# Patient Record
Sex: MALE | Race: WHITE | NOT HISPANIC OR LATINO | Employment: FULL TIME | URBAN - METROPOLITAN AREA
[De-identification: names, ages, dates, MRNs, and addresses within clinical notes are randomized per-mention and may not be internally consistent; named-entity substitution may affect disease eponyms.]

---

## 2017-01-01 ENCOUNTER — HOSPITAL ENCOUNTER (INPATIENT)
Facility: CLINIC | Age: 22
LOS: 1 days | Discharge: HOME | DRG: 885 | End: 2017-01-02
Attending: PSYCHIATRY & NEUROLOGY | Admitting: PSYCHIATRY & NEUROLOGY

## 2017-01-01 ENCOUNTER — HOSPITAL ENCOUNTER (EMERGENCY)
Facility: CLINIC | Age: 22
Discharge: ANOTHER HOSPITAL | End: 2017-01-01
Attending: EMERGENCY MEDICINE | Admitting: EMERGENCY MEDICINE

## 2017-01-01 VITALS
TEMPERATURE: 97.7 F | OXYGEN SATURATION: 98 % | SYSTOLIC BLOOD PRESSURE: 103 MMHG | DIASTOLIC BLOOD PRESSURE: 49 MMHG | RESPIRATION RATE: 18 BRPM

## 2017-01-01 DIAGNOSIS — F10.920 ALCOHOL INTOXICATION, UNCOMPLICATED (H): ICD-10-CM

## 2017-01-01 DIAGNOSIS — F33.2 MAJOR DEPRESSIVE DISORDER, RECURRENT SEVERE WITHOUT PSYCHOTIC FEATURES (H): Primary | ICD-10-CM

## 2017-01-01 DIAGNOSIS — R45.851 SUICIDAL IDEATION: ICD-10-CM

## 2017-01-01 PROBLEM — F32.A DEPRESSED: Status: ACTIVE | Noted: 2017-01-01

## 2017-01-01 PROBLEM — F10.10 ALCOHOL CONSUMPTION BINGE DRINKING: Status: ACTIVE | Noted: 2017-01-01

## 2017-01-01 PROBLEM — F17.223: Status: ACTIVE | Noted: 2017-01-01

## 2017-01-01 LAB
ALBUMIN SERPL-MCNC: 4.7 G/DL (ref 3.4–5)
ALBUMIN UR-MCNC: NEGATIVE MG/DL
ALP SERPL-CCNC: 52 U/L (ref 40–150)
ALT SERPL W P-5'-P-CCNC: 24 U/L (ref 0–70)
AMPHETAMINES UR QL SCN: NORMAL
ANION GAP SERPL CALCULATED.3IONS-SCNC: 8 MMOL/L (ref 3–14)
APAP SERPL-MCNC: NORMAL MG/L (ref 10–20)
APPEARANCE UR: CLEAR
AST SERPL W P-5'-P-CCNC: 26 U/L (ref 0–45)
BARBITURATES UR QL: NORMAL
BASOPHILS # BLD AUTO: 0 10E9/L (ref 0–0.2)
BASOPHILS NFR BLD AUTO: 0.5 %
BENZODIAZ UR QL: NORMAL
BILIRUB SERPL-MCNC: 0.9 MG/DL (ref 0.2–1.3)
BILIRUB UR QL STRIP: NEGATIVE
BUN SERPL-MCNC: 13 MG/DL (ref 7–30)
CALCIUM SERPL-MCNC: 8.7 MG/DL (ref 8.5–10.1)
CANNABINOIDS UR QL SCN: NORMAL
CHLORIDE SERPL-SCNC: 107 MMOL/L (ref 94–109)
CO2 SERPL-SCNC: 28 MMOL/L (ref 20–32)
COCAINE UR QL: NORMAL
COLOR UR AUTO: ABNORMAL
CREAT SERPL-MCNC: 0.98 MG/DL (ref 0.66–1.25)
DIFFERENTIAL METHOD BLD: NORMAL
EOSINOPHIL # BLD AUTO: 0.1 10E9/L (ref 0–0.7)
EOSINOPHIL NFR BLD AUTO: 1.6 %
ERYTHROCYTE [DISTWIDTH] IN BLOOD BY AUTOMATED COUNT: 11.6 % (ref 10–15)
ETHANOL SERPL-MCNC: 0.14 G/DL
GFR SERPL CREATININE-BSD FRML MDRD: NORMAL ML/MIN/1.7M2
GLUCOSE SERPL-MCNC: 82 MG/DL (ref 70–99)
GLUCOSE UR STRIP-MCNC: NEGATIVE MG/DL
HCT VFR BLD AUTO: 46.4 % (ref 40–53)
HGB BLD-MCNC: 16.9 G/DL (ref 13.3–17.7)
HGB UR QL STRIP: NEGATIVE
IMM GRANULOCYTES # BLD: 0 10E9/L (ref 0–0.4)
IMM GRANULOCYTES NFR BLD: 0.1 %
KETONES UR STRIP-MCNC: NEGATIVE MG/DL
LEUKOCYTE ESTERASE UR QL STRIP: NEGATIVE
LYMPHOCYTES # BLD AUTO: 2.1 10E9/L (ref 0.8–5.3)
LYMPHOCYTES NFR BLD AUTO: 26.1 %
MCH RBC QN AUTO: 31.5 PG (ref 26.5–33)
MCHC RBC AUTO-ENTMCNC: 36.4 G/DL (ref 31.5–36.5)
MCV RBC AUTO: 86 FL (ref 78–100)
MONOCYTES # BLD AUTO: 0.6 10E9/L (ref 0–1.3)
MONOCYTES NFR BLD AUTO: 7.9 %
MUCOUS THREADS #/AREA URNS LPF: PRESENT /LPF
NEUTROPHILS # BLD AUTO: 5.2 10E9/L (ref 1.6–8.3)
NEUTROPHILS NFR BLD AUTO: 63.8 %
NITRATE UR QL: NEGATIVE
NRBC # BLD AUTO: 0 10*3/UL
NRBC BLD AUTO-RTO: 0 /100
OPIATES UR QL SCN: NORMAL
PCP UR QL SCN: NORMAL
PH UR STRIP: 5 PH (ref 5–7)
PLATELET # BLD AUTO: 210 10E9/L (ref 150–450)
POTASSIUM SERPL-SCNC: 3.5 MMOL/L (ref 3.4–5.3)
PROT SERPL-MCNC: 8 G/DL (ref 6.8–8.8)
RBC # BLD AUTO: 5.37 10E12/L (ref 4.4–5.9)
RBC #/AREA URNS AUTO: 0 /HPF (ref 0–2)
SALICYLATES SERPL-MCNC: NORMAL MG/DL
SODIUM SERPL-SCNC: 143 MMOL/L (ref 133–144)
SP GR UR STRIP: 1 (ref 1–1.03)
URN SPEC COLLECT METH UR: ABNORMAL
UROBILINOGEN UR STRIP-MCNC: NORMAL MG/DL (ref 0–2)
WBC # BLD AUTO: 8.1 10E9/L (ref 4–11)
WBC #/AREA URNS AUTO: <1 /HPF (ref 0–2)

## 2017-01-01 PROCEDURE — 85025 COMPLETE CBC W/AUTO DIFF WBC: CPT | Performed by: EMERGENCY MEDICINE

## 2017-01-01 PROCEDURE — 99285 EMERGENCY DEPT VISIT HI MDM: CPT

## 2017-01-01 PROCEDURE — 12400001 ZZH R&B MH UMMC

## 2017-01-01 PROCEDURE — 80329 ANALGESICS NON-OPIOID 1 OR 2: CPT | Performed by: EMERGENCY MEDICINE

## 2017-01-01 PROCEDURE — 99222 1ST HOSP IP/OBS MODERATE 55: CPT | Mod: AI | Performed by: PSYCHIATRY & NEUROLOGY

## 2017-01-01 PROCEDURE — 25000132 ZZH RX MED GY IP 250 OP 250 PS 637: Performed by: PSYCHIATRY & NEUROLOGY

## 2017-01-01 PROCEDURE — 80053 COMPREHEN METABOLIC PANEL: CPT | Performed by: EMERGENCY MEDICINE

## 2017-01-01 PROCEDURE — 80307 DRUG TEST PRSMV CHEM ANLYZR: CPT | Performed by: EMERGENCY MEDICINE

## 2017-01-01 PROCEDURE — 93005 ELECTROCARDIOGRAM TRACING: CPT

## 2017-01-01 PROCEDURE — 81001 URINALYSIS AUTO W/SCOPE: CPT | Performed by: EMERGENCY MEDICINE

## 2017-01-01 PROCEDURE — 80320 DRUG SCREEN QUANTALCOHOLS: CPT | Performed by: EMERGENCY MEDICINE

## 2017-01-01 RX ORDER — BISACODYL 10 MG
10 SUPPOSITORY, RECTAL RECTAL DAILY PRN
Status: DISCONTINUED | OUTPATIENT
Start: 2017-01-01 | End: 2017-01-02 | Stop reason: HOSPADM

## 2017-01-01 RX ORDER — IBUPROFEN 200 MG
600 TABLET ORAL EVERY 6 HOURS PRN
Status: DISCONTINUED | OUTPATIENT
Start: 2017-01-01 | End: 2017-01-02 | Stop reason: HOSPADM

## 2017-01-01 RX ORDER — NICOTINE 21 MG/24HR
1 PATCH, TRANSDERMAL 24 HOURS TRANSDERMAL DAILY
Status: DISCONTINUED | OUTPATIENT
Start: 2017-01-01 | End: 2017-01-02 | Stop reason: HOSPADM

## 2017-01-01 RX ORDER — OLANZAPINE 10 MG/2ML
10 INJECTION, POWDER, FOR SOLUTION INTRAMUSCULAR
Status: DISCONTINUED | OUTPATIENT
Start: 2017-01-01 | End: 2017-01-02 | Stop reason: HOSPADM

## 2017-01-01 RX ORDER — OLANZAPINE 10 MG/1
10 TABLET ORAL
Status: DISCONTINUED | OUTPATIENT
Start: 2017-01-01 | End: 2017-01-02 | Stop reason: HOSPADM

## 2017-01-01 RX ORDER — TRAZODONE HYDROCHLORIDE 50 MG/1
50 TABLET, FILM COATED ORAL
Status: DISCONTINUED | OUTPATIENT
Start: 2017-01-01 | End: 2017-01-02 | Stop reason: HOSPADM

## 2017-01-01 RX ORDER — ACETAMINOPHEN 325 MG/1
650 TABLET ORAL EVERY 4 HOURS PRN
Status: DISCONTINUED | OUTPATIENT
Start: 2017-01-01 | End: 2017-01-02 | Stop reason: HOSPADM

## 2017-01-01 RX ORDER — SERTRALINE HYDROCHLORIDE 25 MG/1
25 TABLET, FILM COATED ORAL DAILY
Status: DISCONTINUED | OUTPATIENT
Start: 2017-01-01 | End: 2017-01-02 | Stop reason: HOSPADM

## 2017-01-01 RX ORDER — HYDROXYZINE HYDROCHLORIDE 25 MG/1
25-50 TABLET, FILM COATED ORAL EVERY 4 HOURS PRN
Status: DISCONTINUED | OUTPATIENT
Start: 2017-01-01 | End: 2017-01-02 | Stop reason: HOSPADM

## 2017-01-01 RX ORDER — ALUMINA, MAGNESIA, AND SIMETHICONE 2400; 2400; 240 MG/30ML; MG/30ML; MG/30ML
30 SUSPENSION ORAL EVERY 4 HOURS PRN
Status: DISCONTINUED | OUTPATIENT
Start: 2017-01-01 | End: 2017-01-02 | Stop reason: HOSPADM

## 2017-01-01 RX ADMIN — SERTRALINE HYDROCHLORIDE 25 MG: 25 TABLET ORAL at 13:04

## 2017-01-01 RX ADMIN — NICOTINE POLACRILEX 8 MG: 4 GUM, CHEWING ORAL at 13:02

## 2017-01-01 RX ADMIN — NICOTINE POLACRILEX 8 MG: 4 GUM, CHEWING ORAL at 18:34

## 2017-01-01 RX ADMIN — TRAZODONE HYDROCHLORIDE 50 MG: 50 TABLET ORAL at 20:46

## 2017-01-01 RX ADMIN — NICOTINE 1 PATCH: 21 PATCH, EXTENDED RELEASE TRANSDERMAL at 09:23

## 2017-01-01 RX ADMIN — IBUPROFEN 600 MG: 200 TABLET, FILM COATED ORAL at 11:03

## 2017-01-01 ASSESSMENT — ACTIVITIES OF DAILY LIVING (ADL)
BATHING: 0-->INDEPENDENT
GROOMING: INDEPENDENT
TRANSFERRING: 0-->INDEPENDENT
RETIRED_EATING: 0-->INDEPENDENT
RETIRED_COMMUNICATION: 0-->UNDERSTANDS/COMMUNICATES WITHOUT DIFFICULTY
DRESS: 0-->INDEPENDENT
TOILETING: 0-->INDEPENDENT
PRIOR_FUNCTIONAL_LEVEL_COMMENT: OK
FALL_HISTORY_WITHIN_LAST_SIX_MONTHS: NO
LAUNDRY: UNABLE TO COMPLETE
DRESS: SCRUBS (BEHAVIORAL HEALTH);INDEPENDENT
SWALLOWING: 0-->SWALLOWS FOODS/LIQUIDS WITHOUT DIFFICULTY
ORAL_HYGIENE: INDEPENDENT
AMBULATION: 0-->INDEPENDENT
COGNITION: 0 - NO COGNITION ISSUES REPORTED

## 2017-01-01 NOTE — ED PROVIDER NOTES
"  History   Chief Complaint:  Suicidal and alcohol intoxication      HPI   Brady Sullivan is a 21 year old male who presents to the ED for evaluation of suicide attempt and alcohol intoxication. The patient indicates taht he started drinking this afternoon and tonight he was having flash backs and he started cutting his thighs. He contacted his friend and told him that \"I will blow my head off\". Patient had guns in the house and his friend took the guns away and called 911. Patient reports that he has a history of depression but does not take any medications currently.   Patient admits to depression and is not currently undergoing treatment.  Patient was intragraming with his friends who called 911.  Denies drugs abut admits to drinking today- last drink about 2 hours ago.  Patient states he has a long history of depression but is currently untreated.    Allergies:  NKDA     Medications:    The patient is currently on no regular medications.     Past Medical History:    Boxer's fracture    Past Surgical History:    The patient does not have any pertinent past surgical history.    Family History:    No past pertinent family history.    Social History:  Light tobacco smoker   Alcohol use-weekly   Marital Status:  Single     Review of Systems   Psychiatric/Behavioral: Positive for suicidal ideas and self-injury.   All other systems reviewed and are negative.  Patient with h/o suicidal ideations. H/o cutting and depression. Per EMS, patient was out with friends drinking tonight and got into a fight with his friend, began talking about suicide. Patients friend took any weapons that he could harm himself with and called 911 for patient to be evaluated. Patient states that he had a plan which involved cutting himself until he bled out.     Physical Exam   First Vitals:  BP: 139/89 mmHg  Heart Rate: 98  Temp: 97.7  F (36.5  C)  Resp: 18  SpO2: 99 %  (normal)    Physical Exam   Musculoskeletal:        Legs:    GEN: patient " tearful  HEAD: atraumatic, normocephalic  EYES: pupils reactive, conjunctivae normal  ENT: TMs flat and white bilaterally, oropharynx normal with no erythema or exudate, mucus membranes dry  NECK: no cervical LAD  RESPIRATORY: no tachypnea, breath sounds clear to auscultation (no rales, wheezes, rhonchi)  CVS: normal S1/S2, no murmurs/rubs/gallops  ABDOMEN: soft, nontender, no masses or organomegaly, no rebound, positive bowel sounds  BACK: no costovertebral angle tenderness, no spinal tenderness  EXTREMITIES: intact pulses x  2 (radial pulses intact) no edema  SKIN: warm and dry, abrasions (superficial) noted on bilateral anterior thighs.  None need stitches.  No bleeding.  NEURO: GCS 15, cranial nerves intact.  Motor- moves all 4 extremities.  Sensation- intact.  Coordination- ambulatory.  Overall symmetrical exam  HEME: no bruising or petechiae/contusions  LYMPH: no lymphadenopathy  PSYCH: Patient tearful, upset.  Normal mental status.    Emergency Department Course   ECG:  Indication: Alcohol intoxication   Time: 0402  Vent. Rate 91 bpm. ND interval 214. QRS duration 92. QT/QTc 338/415. P-R-T axis 73 48 55. Sinus rhythm with 1st degree AV block, otherwise normal ECG Read time: 0402  No acute ST or T wave changes.    Laboratory:  UA with micro: Mucous present o/w negative  CBC: WBC: 8.1, HGB: 16.9, PLT: 210  CMP: All WNL (Creatinine: 0.98)    Acetaminophen level <2  Salicylate level <2  Alcohol ethyl: 0.14(H)  Drug screen: All negative     ED Interventions:  [place]  Cardiac/Sp02 monitoring    Emergency Department Course:  Nursing notes and vitals reviewed. I performed an exam of the patient as documented above.     Blood drawn. This was sent to the lab for further testing, results above.    The patient provided a urine sample here in the emergency department. This was sent for laboratory testing, findings above.     4:50 AM Called intake, on hold    Patient voluntary    06:00 I signed off the patient to my  "collegue Dr. Kelley, awaiting DEC evaluation and intake.     /49 mmHg  Temp(Src) 97.7  F (36.5  C) (Temporal)  Resp 18  SpO2 98%      Impression & Plan    Medical Decision Making:  Brady Sullivan is a 21 year old male who felt suicidal and was snap chatting with his friends and admitted to them that he was suicidal and would like inpatient treatment. He made a suicide attempt tonight by cutting his thighs.   Patient's friends called 911 and the patient is brought to the ED for evaluation.  On examination, he has multiple superficial cuts/abrasion on his thighs but nothing that needs stitches. He admits to drinking alcohol. His alcohol level 0.12 (ie he is acutely intoxicated). IV was placed and labs collected.  Salicylate and Tylenol are negative. He has plans to \"blow his head off.\" Guns are apparently in his house. His urinalysis does not show any evidence of infection. His drug abuse screen is negative. CMP was normal including his CBC.     He has been placed on a hold and are attempting to contact intake so the patient would be taken to inpatient psychiatry.   Patient is placed on an [facility] hold.    Diagnosis:  Alcohol intoxication  Suicidal ideation    ICD-10-CM                                                Lizette Said  1/1/2017   [facility] EMERGENCY DEPARTMENT    Lizette CALLAHAN Said, am serving as a scribe on 1/1/2017 at 3:38 AM to personally document services performed by Dania Samaniego MD based on my observations and the provider's statements to me.       Dania Samaniego MD  01/01/17 6650  "

## 2017-01-01 NOTE — PROGRESS NOTES
"Initial Psychosocial Assessment    I have reviewed the chart, met with the patient, and developed Care Plan.  Information for assessment was obtained from:     Electronic Records and interview with patient    Presenting Problem:  Per patient: \"Flashbacks from all the times I cut before, last night something triggered it and I went back to my old ways.\"  He reports that he had been drinking.    Per ED note: Brady Sullivan is a 21 year old male who presents to the ED for evaluation of suicide attempt and alcohol intoxication. The patient indicates taht he started drinking this afternoon and tonight he was having flash backs and he started cutting his thighs. He contacted his friend and told him that \"I will blow my head off\". Patient had guns in the house and his friend took the guns away and called 911. Patient reports that he has a history of depression but does not take any medications currently.   Patient admits to depression and is not currently undergoing treatment.  Patient was intragraming with his friends who called 911.  Denies drugs abut admits to drinking today- last drink about 2 hours ago.    History of Mental Health and Chemical Dependency:  This is the patient's first hospitalization for mental health.  He reports that last night was the first time he had cut in three years.  He reports that three years ago he cut every other day for a week or so.  Denies any issues of chemical use.    Family Description (Constellation, Family Psychiatric History):  Patient grew up in [place], was raised by his parents. He has two younger sisters. Denies any MH or CD issues within his family.    Significant Life Events (Illness, Abuse, Trauma, Death):  Reports that he was engaged three years ago and found his finance cheated on him three times within a weekend, that was when he started cutting    Living Situation:  Lives with grandmother in [place]    Educational Background:  Graduated HS    Occupational " "History:  Full time  at [employer]    Financial Status:  Works    Legal Issues:  Denies    Ethnic/Cultural Issues:  Denies    Spiritual Orientation:  \"Nope\"     Service History:  \"I was planning on joining the [employer], did mini boot camp, and then no.\"    Social Functioning (organization, interests):  Patient enjoys hanging out with his girlfriend of three weeks, plays hockey, watch movies and relax    Current Treatment Providers are:  Therapist: None  Psychiatrist: None    Social Service Assessment/Plan:  Patient has been admitted for psychiatric stabilization due to increased depression with SI. Patient will have psychiatric assessment and medication management by the psychiatrist. Medications will be reviewed and adjusted per MD as indicated. The treatment team will continue to assess and stabilize the patient's mental health symptoms with the use of medications and therapeutic programming. Hospital staff will provide a safe environment and a therapeutic milieu. Staff will continue to assess patient as needed. Patient will participate in unit groups and activities. Patient will receive individual and group support on the unit.  CTC will do individual inpatient treatment planning and after care planning. CTC will discuss options for increasing community supports with the patient. CTC will coordinate with outpatient providers and will place referrals to ensure appropriate follow up care is in place.  "

## 2017-01-01 NOTE — PLAN OF CARE
Problem: General Plan of Care (Inpatient Behavioral)  Goal: Team Discussion  Team Plan:  BEHAVIORAL TEAM DISCUSSION    Continued Stay Criteria/Rationale: Increased depression with SI  Plan: Monitor, assess and stabilize  Participants: Nicholas Ramos RN; FLORINDA Woods  Summary/Recommendation: The plan is to assess and patient for mental health and medication needs.  The patient will be prescribed medications to treat the identified symptoms.  Upon discharge the patient will be referred to services as appropriate based on the assessment.  He would benefit from a mental health therapist.  Medical/Physical: Deferred to medicine  Progress: Initial

## 2017-01-01 NOTE — ED NOTES
Patient with h/o suicidal ideations. H/o cutting and depression. Per EMS, patient was out with friends drinking tonight and got into a fight with his friend, began talking about suicide. Patients friend took any weapons that he could harm himself with and called 911 for patient to be evaluated. Patient states that he had a plan which involved cutting himself until he bled out. Patient is willing to contact staff with any further thoughts.

## 2017-01-01 NOTE — PROGRESS NOTES
01/01/17 0836   Patient Belongings   Did you bring any home meds/supplements to the hospital?  No   Patient Belongings cell phone/electronics;clothing;shoes;money (see comment);wallet   Disposition of Belongings debit cards x5 & [place] to security, All other belongings to pt bin in locked utility   Belongings Search Yes   Clothing Search Yes   Second Staff Nicholas Ramos RN     Brown boots, cell phone, , wallet brown, t-shirt, black sweatshirt, sweat pants to pt bin    To Security: [place], Debit [phone]    ADMISSION:  I am responsible for any personal items that are not sent to the safe or pharmacy. [facility] is not responsible for loss, theft or damage of any property in my possession.    Patient Signature _____________________ Date/Time _____________________    Staff Signature _______________________ Date/Time _____________________    2nd Staff person, if patient is unable/unwilling to sign  ___________________________________ Date/Time _____________________    DISCHARGE:  My personal items have been returned to me.   Patient Signature _____________________ Date/Time _____________________

## 2017-01-01 NOTE — IP AVS SNAPSHOT
Young Adult [facility]    [facility] [address]    [address] [address]    Phone:  [phone]                                       After Visit Summary   1/1/2017    Brady Sullivan    MRN: [id number]           After Visit Summary Signature Page     I have received my discharge instructions, and my questions have been answered. I have discussed any challenges I see with this plan with the nurse or doctor.    ..........................................................................................................................................  Patient/Patient Representative Signature      ..........................................................................................................................................  Patient Representative Print Name and Relationship to Patient    ..................................................               ................................................  Date                                            Time    ..........................................................................................................................................  Reviewed by Signature/Title    ...................................................              ..............................................  Date                                                            Time

## 2017-01-01 NOTE — ED NOTES
Bed: ED07  Expected date: 1/1/17  Expected time: 3:04 AM  Means of arrival: Ambulance  Comments:  596 21 yr male

## 2017-01-01 NOTE — H&P
"DATE OF ADMISSION:  01/01/2017      DATE OF SERVICE:  01/01/2017      CHIEF COMPLAINT:  \"I started cutting.\"      HISTORY OF PRESENT ILLNESS:  Brady Sullivan is a 21-year-old man who reports a past depressive episode about 3 years ago where he was close to suicide.  He saw a therapist briefly at that time, but then stopped going.  He had been doing fairly well but does have these episodes where he will feel depressed.  He says that he ended up drinking with some friends, he was feeling down, got in a fight with one of his friends, and then made threat to another friend that he wanted to kill himself, so he was brought into the emergency room.  He had indicated a plan to shoot himself, so his friend went and got the guns out of his house.  The patient initially was denying that he is depressed and minimizing a lot of his stuff as time goes on through our conversation.  He does indicate that he has been feeling more depressed.  He would like to start an antidepressant medication.  He also would like to get off of his chewing tobacco as that costs some money.  The patient does have a full-time job at [employer].  He also plows snow.  He never completed all the paperwork for his insurance at [employer], is a little anxious about that.  He does report poor sleep.  He says that he will often sleep only an hour or 2 at night.  He says he will usually be able to get through the day, but he does definitely feel tired, it is not a decreased need for sleep.  He says his appetite is otherwise good.  His mood kind of fluctuates up and down, he will force himself out of a depressive episode by thinking good thoughts.  He said really he was doing fine until yesterday.      PAST PSYCHIATRIC HISTORY:  He has never had an inpatient hospitalization, does not currently have a therapist.  Suicide attempts are none.  He did have a near attempt apparently 3 years ago.      PAST MEDICAL HISTORY:  The patient denies chronic or acute medical " issues.      SUBSTANCE HISTORY:  The patient uses chewing tobacco.  He says he drinks alcohol on the weekends.  When he drinks heavily, he will drink up to 10, but often he will only have 1-2.     Patient denies marijuana or illicit drug use.      PHYSICAL REVIEW OF SYSTEMS:  The patient denies any problems on 10-point review of systems except as noted in HPI.      FAMILY HISTORY:  The patient denies any family history of mental illness.      SOCIAL HISTORY:  The patient currently lives with his grandmother.  He works 2 jobs as noted in HPI.  He denies any history of violence or abuse in his past.      ALLERGIES:  No known drug allergies.      PRIOR TO ADMISSION MEDICATIONS:  None.      LABORATORY DATA:  Comprehensive metabolic panel within normal limits.  Glucose 82.  CBC with differential within normal limits.  Urinalysis is normal.  Acetaminophen level and salicylate levels are negative.  Alcohol was 0.14 in the emergency room.  Urine toxicology is negative for amphetamines, cocaine, opiates, cannabinoids, barbiturates, PCP, benzodiazepines.      PHYSICAL EXAMINATION:   VITAL SIGNS:  Temperature 98.1, pulse is 90, respiratory rate is 17, blood pressure is 126/64, oxygen saturation 97% on room air.      I have reviewed the emergency room note as documented by Dania Samaniego dated 01/01/2017.  I have no additional findings at this time.      MENTAL STATUS EXAMINATION:  The patient is alert and oriented to person, place and date.  He is cooperative throughout the interview.  Speech is normal in rate and volume.  Language is intact.  Mood is euthymic.  Affect is congruent to mood.  He has no psychomotor agitation or retardation.  Muscle strength and tone and gait and station are within normal limits.  He is quite tired appearing.  His eyes appear mildly bloodshot.  Thought process is linear and goal oriented.  Associations are intact.  Thought content is currently negative for suicidal thoughts, homicidal thoughts or  signs of psychosis.  Recent and remote memory are intact.  Fund of knowledge is adequate.  Attention and concentration are intact.  Insight is fair, judgment is fair currently.      DIAGNOSES:   1.  Major depressive disorder, recurrent, severe without psychotic features.   2.  Nicotine dependence, chewing tobacco type and withdrawal.   3.  Rule out alcohol use disorder with binge drinking pattern.      PLAN:   1.  The patient is agreeable to starting Zoloft 25 mg daily.  He does feel that this might help for his depression.   2.  Will have trazodone available as needed for sleep to help the sleep patterns when he is able to get time to sleep at home.   3.  The patient to continue with a patch, wants to try nicotine gum as he plans to try and quit using when he leaves the hospital.   4.  Legal.  The patient is currently here voluntarily.   5.  Disposition.  The patient would like to stay 1 more night with plans to discharge tomorrow, he has to work on Tuesday.  He is here voluntarily, so anticipate discharge tomorrow.         CHRISTIANO HERNANDEZ MD             D: 2017 12:56   T: 2017 16:05   MT: LUCY      Name:     JOSSELINE HASTINGS   MRN:      [id number]        Account:      [id number]   :      1995           Admitted:     [id number]      Document: [id number]

## 2017-01-01 NOTE — IP AVS SNAPSHOT
MRN:[id number]                      After Visit Summary   1/1/2017    Brady Sullivan    MRN: [id number]           Thank you!     Thank you for choosing [facility] for your care. Our goal is always to provide you with excellent care. Hearing back from our patients is one way we can continue to improve our services. Please take a few minutes to complete the written survey that you may receive in the mail after you visit with us. Thank you!        Patient Information     Date Of Birth          1995        About your hospital stay     You were admitted on:  January 1, 2017 You last received care in the:  [facility]    You were discharged on:  January 2, 2017       Who to Call     For medical emergencies, please call 911.  For non-urgent questions about your medical care, please call your primary care provider or clinic, [phone]          Attending Provider     Provider    Haja Moon MD       Primary Care Provider Office Phone # Fax #    Haja Srivastava [phone]       [place] [address]        Further instructions from your care team       Behavioral Discharge Planning and Instructions      Summary: You were admitted on 1/1/2017 to [facility] [address] [place] [address] for Depression and Suicidal Ideations.  You were treated by Dr. Haja Moon MD and discharged on 1/2/2017.     Disposition: Discharged to home    Main Diagnosis:  Major Depressive D/O    Health Care Follow-up Appointments:   Medication Management  Date: 1/17/17  Time: 9:30 am     [facility] Crisis Response Unit: at [phone], which provides 24-hour telephone or on-site response, as well as referrals, to residents in a mental health crisis.    Attend all scheduled appointments with your outpatient providers. Call at least 24 hours in advance if you need to reschedule an appointment to ensure continued access to your outpatient providers.  "  Additional Information: You where provided with the number for Erica HARRIS in the business office to discuss insurance or bill related questions or concerns. Erica's phone number is [phone].  Major Treatments, Procedures and Findings: You were provided with: a psychiatric assessment, assessed for medical stability, medication evaluation and/or management, group therapy, milieu management, medical interventions and skills groups.    Symptoms to Report: feeling more aggressive, increased confusion, losing more sleep, mood getting worse or thoughts of suicide    Early warning signs can include:  increased depression or anxiety sleep disturbances increased thoughts or behaviors of suicide or self-harm  increased unusual thinking, such as paranoia or hearing voices    Safety and Wellness:  Take all medicines as directed.  Make no changes unless your doctor suggests them.  Follow treatment recommendations.  Refrain from alcohol and non-prescribed drugs.  Ask your support system to help you reduce your access to items that could harm yourself or others. Items could include:  Firearms  Medicines (both prescribed and over-the-counter)  Knives and other sharp objects  Ropes and like materials  Car keys  If there is a concern for safety, call 911. If there is a concern for safety, call 911.    Resources:  Crisis Intervention: 994.794.8224 or 304-637-9806 (TTY: 577.100.2915).  Call anytime for help.  National [place] on Mental Illness (www.mn.john.org): 839.622.9543 or 356-201-5718.  Suicide Awareness Voices of Education (SAVE) (www.save.org): 612-934-EABG (2316)  National Suicide Prevention Line (www.mentalhealthmn.org): 753-077-BUJJ (1983)  Mental Health Consumer/Survivor Network of [place] (www.mhcsn.net): 664.135.3713 or 821-508-7663  Mental Health Association [place] (www.mentalhealth.org): 968.856.1722 or 068-618-8148  [facility] Crisis (COPE) Response - Adult [phone]  Text 4 Life: txt \"LIFE\" to 28370 for immediate " "support and crisis intervention  Crisis text line: Text \"START\" to 524-423. Free, confidential, .  Crisis Intervention: 493.905.6294 or 949-357-5234. Call anytime for help.     The treatment team has appreciated the opportunity to work with you. Brady ,  please take care and make your recovery a daily recovery. If you have any questions or concerns our unit number is [phone].  You will be receiving a follow-up phone call within the next three days from a representative from behavioral health.  You have identified the best phone number to reach you as [phone].    Pending Results     No orders found for last 2 day(s).            Statement of Approval     Ordered          17 1056  I have reviewed and agree with all the recommendations and orders detailed in this document.   EFFECTIVE NOW     Approved and electronically signed by:  Haja Moon MD             Admission Information        Department Dept Phone    2017 [facility]  [phone]      Your Vitals Were     Blood Pressure Pulse Temperature Respirations Weight Pulse Oximetry    129/70 mmHg 76 96.8  F (36  C) (Oral) 17 78.835 kg (173 lb 12.8 oz) 97%      MyChart Information     NaviExpert lets you send messages to your doctor, view your test results, renew your prescriptions, schedule appointments and more. To sign up, go to [email or web address] . Click on \"Log in\" on the left side of the screen, which will take you to the Welcome page. Then click on \"Sign up Now\" on the right side of the page.     You will be asked to enter the access code listed below, as well as some personal information. Please follow the directions to create your username and password.     Your access code is: [email or web address]  Expires: 2017  1:37 PM     Your access code will  in 90 days. If you need help or a new code, please call your [facility] clinic or [phone].           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             Medication List: This is a list of all your medications and when to take them. Check marks below indicate your daily home schedule. Keep this list as a reference.      Medications           Morning Afternoon Evening Bedtime As Needed    sertraline 50 MG tablet   Commonly known as:  ZOLOFT   Take one-half tab daily for 6 days then increase to one tab daily.   Last time this was given:  25 mg on 1/2/2017  9:01 AM                                traZODone 50 MG tablet   Commonly known as:  DESYREL   Take 1 tablet (50 mg) by mouth nightly as needed for sleep   Last time this was given:  50 mg on 1/1/2017  8:46 PM                                          More Information        Treating Insomnia  Good sleeping habits are a key part of treatment. If needed, some medications may help you sleep better at first. Making healthy lifestyle changes and learning to relax can improve your sleep. Treating insomnia takes commitment, but trust that your efforts will pay off. Talk to your health care provider before  taking any medication.    Healthy Lifestyle  Your lifestyle affects your health and your sleep. Here are some healthy habits:    Keep a regular sleep schedule. Go to bed and get up at the same time each day.    Exercise regularly. It may help you reduce stress. Avoid strenuous exercise for two to four hours before bedtime.    Avoid or limit naps.    Use your bed only for sleep and sex.    Don t spend too much time in bed trying to fall asleep. If you can t fall asleep, get up and do something until you become tired and drowsy.    Avoid or limit caffeine and nicotine. They can keep you awake at night. Also avoid alcohol. It may help you fall asleep at first, but your sleep will not be restful.  Before Bedtime  To sleep better every night, try these tips:    Have a bedtime routine to let your body and mind know when it s time to sleep.    Going to bed should be relaxing so try to do only relaxing things around bedtime. Sleep will come sooner.    If your worries don t let you sleep, write them down in a diary. Then close it, and go to bed.    Make sure the room is not too hot or too cold. If it s not dark enough, an eye mask can help. If it s noisy, try using earplugs.  Learn to Relax  Stress, anxiety, and body tension may keep you awake at night. To unwind before bedtime, try reading a book, meditation, or yoga. Also, try the following:    Deep breathing. Sit or lie back in a chair. Take a slow, deep breath. Hold it for 5 counts. Then breathe out slowly through your mouth. Keep doing this until you feel relaxed.    Imagery. Think of the last fun trip you took. In your mind, walk through the trip from start to finish. Put as much detail into the memory as you can remember. It will help you relax.  Cognitive Behavioral Therapy (CBT)  CBT is the most effective treatment for long-term insomnia. It tries to address the underlying causes of your sleep problems, including your habits and how you think about  sleep.   Individual Therapy  Sterling Maddox, PhD  Insomnia   [facility]: [phone]    [facility]: [phone]  Group Therapy  Dates and times to be announced.  Online Programs    www.SHUTi.me (pronounced shut eye). There is a fee for this program. Enter the code  [facility]  if you decide to enroll in this program.     www.sleepIO.com (pronounced sleep ee oh). There is a fee for this program. Enter the code  [facility]  if you decide to enroll in this program.  Suggested Resources  Insomnia Treatment Books:    Overcoming Insomnia by Farrukh Stout and Brittany Thomas (2008)    No More Sleepless Nights by Tom Lal and Lesia Best (1996)    Say Waqas to Insomnia by Osito Davila (2009)    The Insomnia Workbook by Sondra Tan and Delio Galdamez (2009)    The Insomnia Answer by Rodolfo Garcia and Ladarius Tyson (2006)?  Stress Management and Relaxation Books:    The Relaxation and Stress Reduction Workbook by Cynthia Du, Dayan Del Real and Rigo Engel (2008)    Stress Management Workbook: Techniques and Self-Assessment Procedures by Matilda Ann and Nguyễn Haines (1997)    A Mindfulness-Based Stress Reduction Workbook by Alfonzo Monteiro and Dyan Briggs (2010)    The Complete Stress Management Workbook by Jerry Avila and Colt Hallman (1996)    Assert Yourself by Kenyatta Macias and Serafin Macias (1977)  Relaxation Resources for Computer Download   These websites offer resources to help you relax. This list is for information only. [facility] is not responsible for the quality of services or the actions of any person or organization  Progressive Muscle Relaxation (PMR):    http://www.innerActiveReplayudio.com/progressive-muscle-relaxation-exercise.html    http://studentsupport.[facility]/counseling/resources/self-help/relaxation-and-stress-management/  Deep Breathing  Exercises:    http://www.Netsket/breathing-awareness.html  Meditation:       www.Sern[employer]heManipal [employer]    www.theSpindleguided-meditation-site.com You may have to pay for some of these resources.  Guided Imagery:    http://www.Netsket/guided-imagery-scripts.html    http://[employer]/library/tfcbdxeyfw-dkoviu-vvvofqg/  Counseling / Behavioral Health  [facility]  Visit www.Startex.org or call [phone] to find a clinic close to you.   This is not a prescription and these resources are optional. You must pay for any costs when using these resources. Please ask your insurance carrier if you can be reimbursed for these resources. If so, you are responsible for sending the needed details to your insurance carrier. These resources may also be tax deductible as medical expenses. Check with your .  These programs and publications are not affiliated in any way with [facility].    9329-0128 [employer]. [address] [place] [address], [place] [address]. All rights reserved. This information is not intended as a substitute for professional medical care. Always follow your healthcare professional's instructions.
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
## 2017-01-02 VITALS
SYSTOLIC BLOOD PRESSURE: 129 MMHG | OXYGEN SATURATION: 97 % | TEMPERATURE: 96.8 F | HEART RATE: 76 BPM | RESPIRATION RATE: 17 BRPM | DIASTOLIC BLOOD PRESSURE: 70 MMHG | WEIGHT: 173.8 LBS

## 2017-01-02 LAB
ALBUMIN SERPL-MCNC: 4 G/DL (ref 3.4–5)
ALP SERPL-CCNC: 46 U/L (ref 40–150)
ALT SERPL W P-5'-P-CCNC: 24 U/L (ref 0–70)
ANION GAP SERPL CALCULATED.3IONS-SCNC: 5 MMOL/L (ref 3–14)
AST SERPL W P-5'-P-CCNC: 18 U/L (ref 0–45)
BASOPHILS # BLD AUTO: 0 10E9/L (ref 0–0.2)
BASOPHILS NFR BLD AUTO: 1 %
BILIRUB SERPL-MCNC: 1.5 MG/DL (ref 0.2–1.3)
BUN SERPL-MCNC: 17 MG/DL (ref 7–30)
CALCIUM SERPL-MCNC: 9.1 MG/DL (ref 8.5–10.1)
CHLORIDE SERPL-SCNC: 107 MMOL/L (ref 94–109)
CO2 SERPL-SCNC: 31 MMOL/L (ref 20–32)
CREAT SERPL-MCNC: 1.12 MG/DL (ref 0.66–1.25)
DIFFERENTIAL METHOD BLD: NORMAL
EOSINOPHIL # BLD AUTO: 0.3 10E9/L (ref 0–0.7)
EOSINOPHIL NFR BLD AUTO: 6 %
ERYTHROCYTE [DISTWIDTH] IN BLOOD BY AUTOMATED COUNT: 11.8 % (ref 10–15)
GFR SERPL CREATININE-BSD FRML MDRD: 82 ML/MIN/1.7M2
GLUCOSE SERPL-MCNC: 67 MG/DL (ref 70–99)
HCT VFR BLD AUTO: 46 % (ref 40–53)
HGB BLD-MCNC: 16.1 G/DL (ref 13.3–17.7)
IMM GRANULOCYTES # BLD: 0 10E9/L (ref 0–0.4)
IMM GRANULOCYTES NFR BLD: 0 %
INTERPRETATION ECG - MUSE: NORMAL
LYMPHOCYTES # BLD AUTO: 1.3 10E9/L (ref 0.8–5.3)
LYMPHOCYTES NFR BLD AUTO: 31.3 %
MCH RBC QN AUTO: 31.4 PG (ref 26.5–33)
MCHC RBC AUTO-ENTMCNC: 35 G/DL (ref 31.5–36.5)
MCV RBC AUTO: 90 FL (ref 78–100)
MONOCYTES # BLD AUTO: 0.5 10E9/L (ref 0–1.3)
MONOCYTES NFR BLD AUTO: 11.7 %
NEUTROPHILS # BLD AUTO: 2.1 10E9/L (ref 1.6–8.3)
NEUTROPHILS NFR BLD AUTO: 50 %
NRBC # BLD AUTO: 0 10*3/UL
NRBC BLD AUTO-RTO: 0 /100
PLATELET # BLD AUTO: 160 10E9/L (ref 150–450)
POTASSIUM SERPL-SCNC: 3.9 MMOL/L (ref 3.4–5.3)
PROT SERPL-MCNC: 7.3 G/DL (ref 6.8–8.8)
RBC # BLD AUTO: 5.12 10E12/L (ref 4.4–5.9)
SODIUM SERPL-SCNC: 143 MMOL/L (ref 133–144)
TSH SERPL DL<=0.005 MIU/L-ACNC: 0.66 MU/L (ref 0.4–4)
WBC # BLD AUTO: 4.2 10E9/L (ref 4–11)

## 2017-01-02 PROCEDURE — 80053 COMPREHEN METABOLIC PANEL: CPT | Performed by: PSYCHIATRY & NEUROLOGY

## 2017-01-02 PROCEDURE — 25000132 ZZH RX MED GY IP 250 OP 250 PS 637: Performed by: PSYCHIATRY & NEUROLOGY

## 2017-01-02 PROCEDURE — 85025 COMPLETE CBC W/AUTO DIFF WBC: CPT | Performed by: PSYCHIATRY & NEUROLOGY

## 2017-01-02 PROCEDURE — H2032 ACTIVITY THERAPY, PER 15 MIN: HCPCS

## 2017-01-02 PROCEDURE — 99239 HOSP IP/OBS DSCHRG MGMT >30: CPT | Performed by: PSYCHIATRY & NEUROLOGY

## 2017-01-02 PROCEDURE — 36415 COLL VENOUS BLD VENIPUNCTURE: CPT | Performed by: PSYCHIATRY & NEUROLOGY

## 2017-01-02 PROCEDURE — 84443 ASSAY THYROID STIM HORMONE: CPT | Performed by: PSYCHIATRY & NEUROLOGY

## 2017-01-02 RX ORDER — TRAZODONE HYDROCHLORIDE 50 MG/1
50 TABLET, FILM COATED ORAL
Qty: 30 TABLET | Refills: 1 | Status: SHIPPED | OUTPATIENT
Start: 2017-01-02 | End: 2024-03-27

## 2017-01-02 RX ADMIN — NICOTINE POLACRILEX 8 MG: 4 GUM, CHEWING ORAL at 13:25

## 2017-01-02 RX ADMIN — NICOTINE POLACRILEX 8 MG: 4 GUM, CHEWING ORAL at 08:55

## 2017-01-02 RX ADMIN — SERTRALINE HYDROCHLORIDE 25 MG: 25 TABLET ORAL at 09:01

## 2017-01-02 ASSESSMENT — ACTIVITIES OF DAILY LIVING (ADL)
LAUNDRY: UNABLE TO COMPLETE
DRESS: SCRUBS (BEHAVIORAL HEALTH)
ORAL_HYGIENE: INDEPENDENT
HYGIENE/GROOMING: INDEPENDENT

## 2017-01-02 NOTE — PROGRESS NOTES
01/02/17 1700   Art Therapy   Type of Intervention structured groups   Response participates, initiates socially appropriate   Hours 2   Inside outside portraits. Pt participated for two group hours was appropriately social and pleasant.

## 2017-01-02 NOTE — DISCHARGE INSTRUCTIONS
Behavioral Discharge Planning and Instructions      Summary: You were admitted on 1/1/2017 to Station [address] [place] [address] for Depression and Suicidal Ideations.  You were treated by Dr. Haja Moon MD and discharged on 1/2/2017.     Disposition: Discharged to home    Main Diagnosis:  Major Depressive D/O    Health Care Follow-up Appointments:   Medication Management  Date: 1/17/17  Time: 9:30 am     [facility] Crisis Response Unit: at [phone], which provides 24-hour telephone or on-site response, as well as referrals, to residents in a mental health crisis.    Attend all scheduled appointments with your outpatient providers. Call at least 24 hours in advance if you need to reschedule an appointment to ensure continued access to your outpatient providers.   Additional Information: You where provided with the number for Erica S. in the business office to discuss insurance or bill related questions or concerns. Erica's phone number is [phone].  Major Treatments, Procedures and Findings: You were provided with: a psychiatric assessment, assessed for medical stability, medication evaluation and/or management, group therapy, milieu management, medical interventions and skills groups.    Symptoms to Report: feeling more aggressive, increased confusion, losing more sleep, mood getting worse or thoughts of suicide    Early warning signs can include:  increased depression or anxiety sleep disturbances increased thoughts or behaviors of suicide or self-harm  increased unusual thinking, such as paranoia or hearing voices    Safety and Wellness:  Take all medicines as directed.  Make no changes unless your doctor suggests them.  Follow treatment recommendations.  Refrain from alcohol and non-prescribed drugs.  Ask your support system to help you reduce your access to items that could harm yourself or others. Items could include:  Firearms  Medicines (both prescribed and over-the-counter)  Knives and other sharp objects  Ropes  "and like materials  Car keys  If there is a concern for safety, call 911. If there is a concern for safety, call 911.    Resources:  Crisis Intervention: 145.422.6623 or 125-721-3728 (TTY: 993.893.2014).  Call anytime for help.  National [place] on Mental Illness (www.mn.john.org): 246.100.2074 or 124-418-2519.  Suicide Awareness Voices of Education (SAVE) (www.save.org): 170-246-VKIQ (5954)  National Suicide Prevention Line (www.mentalhealthmn.org): 058-208-NDHF (0286)  Mental Health Consumer/Survivor Network of [place] (www.mhcsn.net): 703.304.3349 or 111-942-4506  Mental Health Association of [place] (www.mentalhealth.org): 497.199.6209 or 970-611-2968  [facility] Crisis (COPE) Response - Adult 753 746-9708  Text 4 Life: txt \"LIFE\" to 66375 for immediate support and crisis intervention  Crisis text line: Text \"START\" to 507-382. Free, confidential, 24/7.  Crisis Intervention: 473.523.9771 or 226-484-3651. Call anytime for help.     The treatment team has appreciated the opportunity to work with you. Brady ,  please take care and make your recovery a daily recovery. If you have any questions or concerns our unit number is [phone].  You will be receiving a follow-up phone call within the next three days from a representative from behavioral health.  You have identified the best phone number to reach you as [phone].  "

## 2017-01-02 NOTE — PLAN OF CARE
"Problem: General Plan of Care (Inpatient Behavioral)  Goal: Individualization/Patient Specific Goal (IP Behavioral)  The patient and/or their representative will achieve their patient-specific goals related to the plan of care.    The patient-specific goals include:     Illness Management Recovery model: Objectives  Patient will identify reason(s) for hospitalization from their perspective.  Patient will identify a minimum of three goals for discharge.  Patient will identify a minimum of three triggers that may increase their symptoms.  Patient will identify a minimum of three coping skills they can do to stay well.   Patient will identify their support system to demonstrate readiness for discharge.    Illness Management & Recovery assists patient to develop relapse prevention as  patient identifies triggers for relapse.  patient identifies a general wellness strategy.  patient identifies the warning signs that they are in danger of relapse.  patient identifies someone they count on to get feedback .  patient identifies ways to take action when in danger of relapse.  patient identifies way to cope with stress or other symptoms.   patient participates in self-reflection.    Outcome: Improving  The patient and/or their representative will achieve their patient-specific goals related to the plan of care.    The patient-specific goals include:       \"Reasons you are in the hospital;\" The patient identifies the following reasons for current hospitalization:   1) Depression  2) Suicidal Thoughts    \"Goals for Discharge\" The patient identifies the following goals for discharge:  1) Talk to friends when feeling down.  2) Find new ways to become happier.  3) \"Find ways to control this outcome so it doesn't happen again.\"            "

## 2017-01-02 NOTE — PROGRESS NOTES
01/02/17 1218   Behavioral Health   Hallucinations denies / not responding to hallucinations   Thinking distractable   Orientation person: oriented;place: oriented;date: oriented;time: oriented   Memory baseline memory   Insight poor   Judgement intact   Eye Contact at examiner   Affect full range affect   Mood mood is calm   ADL Assessment (WDL) WDL   Suicidality other (see comments)  (None voiced)   Self Injury other (see comment)  (None voiced)   Activity other (see comment)  (Social, attended groups)   Speech (WDL) WDL   Medication Sensitivity (WDL) WDL   Psychomotor Gait (WDL) WDL   Overt Agression (WDL) WDL   Psycho Education   Type of Intervention 1:1 intervention   Response participates, initiates socially appropriate   Hours 0.5   Activities of Daily Living   Hygiene/Grooming independent   Oral Hygiene independent   Dress scrubs (behavioral health)   Laundry unable to complete   Room Organization independent   patient expressed interest in discharge early in the day, then made plans to discharge around 2:45.

## 2017-01-02 NOTE — PROGRESS NOTES
Pt request for a return to work note after provider already left. Writer signed a note for verification of hospitalization under Dr Moon. See scanned note.

## 2017-01-02 NOTE — PROGRESS NOTES
"Pt denies any racing, disturbing or self harming thoughts, \"feel good about leaving and about my friends that I didn't realize I had had\" pt denies any SI/IB or HI and confirms he feels hopeful about the future and verbalizes understanding of his medications, name, dose and times scheduled. Review of discharge instructions, all questions answered; pt left with all personal belongings discharge instructions and 30 day supply of medications, walk off unit at 1450 without incident.  "

## 2017-01-02 NOTE — PLAN OF CARE
"Problem: Depressive Symptoms  Goal: Depressive Symptoms  Signs and symptoms of listed problems will be absent or manageable.   Outcome: Improving    01/01/17 2035   Depressive Symptoms   Depressive Symptoms Assessed all   Depressive Symptoms Present none   Pt was calm, bright and visible in the milieu.  Attended and participated in  all groups.  Denies feeling depressed and anxious.  Denies SI/SIB, reports \"feeling great\".  Independent with all ADL's states he is planning to take a shower before bed.  States appetite is good, sleep is ok, denies all medication side effect. Pt had visit from family and friends.  Visit went well.  Pt had no concerns and stated he might be leaving tomorrow.  Will continue to monitor.        "
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
## 2020-03-15 ENCOUNTER — HOSPITAL ENCOUNTER (EMERGENCY)
Facility: CLINIC | Age: 25
Discharge: HOME | End: 2020-03-15
Attending: EMERGENCY MEDICINE | Admitting: EMERGENCY MEDICINE

## 2020-03-15 VITALS
DIASTOLIC BLOOD PRESSURE: 83 MMHG | HEART RATE: 136 BPM | RESPIRATION RATE: 18 BRPM | OXYGEN SATURATION: 96 % | TEMPERATURE: 98 F | SYSTOLIC BLOOD PRESSURE: 138 MMHG

## 2020-03-15 DIAGNOSIS — F10.10 ALCOHOL ABUSE: ICD-10-CM

## 2020-03-15 DIAGNOSIS — G56.32 ACUTE RADIAL NERVE PALSY OF LEFT UPPER EXTREMITY: ICD-10-CM

## 2020-03-15 PROCEDURE — 99283 EMERGENCY DEPT VISIT LOW MDM: CPT
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
## 2020-03-15 NOTE — ED AVS SNAPSHOT
[facility] Emergency Department  [address]  [facility] [address]  Phone:  [phone]  Fax:  [phone]                                    Brady Sullivan   MRN: [id number]    Department:  [facility] Emergency Department   Date of Visit:  3/15/2020           After Visit Summary Signature Page    I have received my discharge instructions, and my questions have been answered. I have discussed any challenges I see with this plan with the nurse or doctor.    ..........................................................................................................................................  Patient/Patient Representative Signature      ..........................................................................................................................................  Patient Representative Print Name and Relationship to Patient    ..................................................               ................................................  Date                                   Time    ..........................................................................................................................................  Reviewed by Signature/Title    ...................................................              ..............................................  Date                                               Time          22EPIC Rev 08/18

## 2024-02-18 ENCOUNTER — HOSPITAL ENCOUNTER (EMERGENCY)
Facility: CLINIC | Age: 29
Discharge: HOME | End: 2024-02-18
Attending: EMERGENCY MEDICINE | Admitting: EMERGENCY MEDICINE

## 2024-02-18 VITALS
TEMPERATURE: 97.2 F | HEART RATE: 110 BPM | DIASTOLIC BLOOD PRESSURE: 79 MMHG | RESPIRATION RATE: 16 BRPM | SYSTOLIC BLOOD PRESSURE: 152 MMHG | OXYGEN SATURATION: 96 %

## 2024-02-18 DIAGNOSIS — H18.821 CORNEAL ABRASION DUE TO CONTACT LENS, RIGHT: ICD-10-CM

## 2024-02-18 PROCEDURE — 99283 EMERGENCY DEPT VISIT LOW MDM: CPT

## 2024-02-18 RX ORDER — CIPROFLOXACIN HYDROCHLORIDE 3.5 MG/ML
1-2 SOLUTION/ DROPS TOPICAL EVERY 6 HOURS
Qty: 5 ML | Refills: 0 | Status: SHIPPED | OUTPATIENT
Start: 2024-02-18 | End: 2024-02-23

## 2024-02-18 RX ORDER — CIPROFLOXACIN HYDROCHLORIDE 3.5 MG/ML
2 SOLUTION/ DROPS TOPICAL EVERY 6 HOURS
Status: DISCONTINUED | OUTPATIENT
Start: 2024-02-18 | End: 2024-02-18 | Stop reason: HOSPADM

## 2024-02-18 RX ORDER — TETRACAINE HYDROCHLORIDE 5 MG/ML
SOLUTION OPHTHALMIC
Status: DISCONTINUED
Start: 2024-02-18 | End: 2024-02-18 | Stop reason: HOSPADM

## 2024-02-18 ASSESSMENT — ACTIVITIES OF DAILY LIVING (ADL): ADLS_ACUITY_SCORE: 33

## 2024-02-18 NOTE — LETTER
February 18, 2024      To Whom It May Concern:      Brady Sullivan was seen in our Emergency Department today, 02/18/24.  I expect his condition to improve over the next 2-3 days.  He may return to work when improved.  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  Further work release may need to come from an eye clinic depending on his symptom improvement      Sincerely,        Otilio Tripp, DO

## 2024-02-19 NOTE — ED PROVIDER NOTES
"  History     Chief Complaint:  Eye Pain       The history is provided by the patient.      Brady Sullivan is a 28 year old male who presents with eye pain. 2 days ago, he took his contacts out that morning and used \"eye wash\" solution instead of saline. Since then, he has had a burning sensation in his right eye. He notes that his vision is normal since he doesn't not have the contact in.     Independent Historian:    None - Patient Only    Review of External Notes:  None     Allergies:  No Known Allergies     Physical Exam   Patient Vitals for the past 24 hrs:   BP Temp Pulse Resp SpO2   02/18/24 1934 (!) 152/79 97.2  F (36.2  C) 110 16 96 %        Physical Exam  Eyes:    EOMI bilaterally. Pupils are equal, round, and reactive to light.      Right eye exhibits no chemosis. There is clear discharge noted.    Right conjunctiva is notably injected. Right conjunctiva has no hemorrhage.     Left eye exhibits no chemosis, no discharge and no exudate.     Left conjunctiva is not injected. Left conjunctiva has no hemorrhage.     No scleral icterus.    No periorbital edema or erythema noted   The lids are normal appearing.  Garcia Lamp:       The right eye shows a corneal abrasion with fluorescein uptake. No FB noted.       The left eye shows no corneal abrasion, no foreign body and no fluorescein uptake.        Emergency Department Course     Laboratory: Imaging:   Labs Ordered and Resulted from Time of ED Arrival to Time of ED Departure - No data to display  No orders to display           Emergency Department Course & Assessments:    Interventions:  Medications   fluorescein (FUL-ASHU) 1 MG ophthalmic strip (has no administration in time range)   tetracaine (PONTOCAINE) 0.5 % ophthalmic solution (has no administration in time range)   ciprofloxacin (CILOXAN) 0.3 % ophthalmic solution 2 drop (has no administration in time range)      Assessments, Independent Interpretation, Consult/Discussion of ManagementTests:  ED Course " as of 02/18/24 2051   Sun Feb 18, 2024 2033 I obtained history and examined the patient as noted above. I explained findings and discussed plan for discharge home.     Social Determinants of Health affecting care:  None    Disposition:  See ED Course and MDM    Impression & Plan    CMS Diagnoses: None    Code Status: Prior    Medical Decision Making:  Brady Sullivan is a 28 year old male who presents with eye discomfort. The exam is significant for abnormality on fluorescein exam. This is consistent with corneal abrasion. No foreign bodies in eyes or lids noted.  No corneal ulcers. No signs of retinal abnormalities, open globe, acute glaucoma, or other serious eye disease.  Will be prescribed antibiotic prophylaxis (Cipro).     The patient noted that he has pain in a couple of days and will pick the antibiotics up then.  I offered to give him an initial dose here though eventually the patient felt he needed to leave and did not get the initial dose.     I recommended ophthalmology follow up ASAP.      Critical Care:  None.    Diagnosis:    ICD-10-CM    1. Corneal abrasion due to contact lens, right  H18.821            Discharge Medications:  New Prescriptions    CIPROFLOXACIN (CILOXAN) 0.3 % OPHTHALMIC SOLUTION    Place 1-2 drops into the right eye every 6 hours for 5 days        Scribe Disclosure:  I, Parth Green, am serving as a scribe at 8:08 PM on 2/18/2024 to document services personally performed by Otilio Tripp DO based on my observations and the provider's statements to me.  2/18/2024   Otilio Tripp DO Burns, Bradley Joseph, DO  02/19/24 0127

## 2024-02-19 NOTE — ED TRIAGE NOTES
"Patient out his contact in \"eye wash\" solution instead of saline, put the contact in and it feels like it is burning.  Vision is \"normal\" since he doesn't have the contact in     Triage Assessment (Adult)       Row Name 02/18/24 1932          Triage Assessment    Airway WDL WDL        Respiratory WDL    Respiratory WDL WDL        Skin Circulation/Temperature WDL    Skin Circulation/Temperature WDL WDL        Cardiac WDL    Cardiac WDL WDL        Peripheral/Neurovascular WDL    Peripheral Neurovascular WDL WDL        Cognitive/Neuro/Behavioral WDL    Cognitive/Neuro/Behavioral WDL WDL                     "

## 2024-02-19 NOTE — DISCHARGE INSTRUCTIONS
What do you do next:   Continue your home medications unless we have specifically changed them  If medications were prescribed today, take these as directed.  You can use over-the-counter Benadryl as needed for itching  You can use over-the-counter preservative-free eyedrops and preservative-free, water-based eye lubricant. (You should wait for 30 minutes after using antibiotic drops before using either of these)  You can use over-the-counter acetaminophen (Tylenol ) and ibuprofen for fever or pain control as applicable to your visit today.  Acetaminophen (Tylenol): Take 500 to 1000 mg by mouth every 6 hours as needed for fever or pain.  Do not take more than 4000 total milligrams of acetaminophen-containing products in a 24-hour timeframe.  Ibuprofen: Take 600 milligrams by mouth every 6-8 hours as needed for fever or pain.  Take this with food or milk to avoid stomach upset.  Follow up as indicated below    When do you return: Review your discharge papers for specifics on reasons to return.    Thank you for allowing us to care for you today.

## 2024-03-26 ENCOUNTER — HOSPITAL ENCOUNTER (OUTPATIENT)
Facility: CLINIC | Age: 29
Setting detail: OBSERVATION
Discharge: HOME | End: 2024-03-27
Attending: EMERGENCY MEDICINE | Admitting: INTERNAL MEDICINE

## 2024-03-26 DIAGNOSIS — K64.4 EXTERNAL HEMORRHOIDS: ICD-10-CM

## 2024-03-26 DIAGNOSIS — K92.1 MELENA: Primary | ICD-10-CM

## 2024-03-26 LAB
BASOPHILS # BLD AUTO: 0.1 10E3/UL (ref 0–0.2)
BASOPHILS NFR BLD AUTO: 1 %
EOSINOPHIL # BLD AUTO: 0.3 10E3/UL (ref 0–0.7)
EOSINOPHIL NFR BLD AUTO: 3 %
ERYTHROCYTE [DISTWIDTH] IN BLOOD BY AUTOMATED COUNT: 11.5 % (ref 10–15)
HCT VFR BLD AUTO: 46.3 % (ref 40–53)
HGB BLD-MCNC: 17.1 G/DL (ref 13.3–17.7)
IMM GRANULOCYTES # BLD: 0.1 10E3/UL
IMM GRANULOCYTES NFR BLD: 1 %
LYMPHOCYTES # BLD AUTO: 3.7 10E3/UL (ref 0.8–5.3)
LYMPHOCYTES NFR BLD AUTO: 41 %
MCH RBC QN AUTO: 31.4 PG (ref 26.5–33)
MCHC RBC AUTO-ENTMCNC: 36.9 G/DL (ref 31.5–36.5)
MCV RBC AUTO: 85 FL (ref 78–100)
MONOCYTES # BLD AUTO: 0.7 10E3/UL (ref 0–1.3)
MONOCYTES NFR BLD AUTO: 8 %
NEUTROPHILS # BLD AUTO: 4.4 10E3/UL (ref 1.6–8.3)
NEUTROPHILS NFR BLD AUTO: 46 %
NRBC # BLD AUTO: 0 10E3/UL
NRBC BLD AUTO-RTO: 0 /100
PLATELET # BLD AUTO: 233 10E3/UL (ref 150–450)
RBC # BLD AUTO: 5.44 10E6/UL (ref 4.4–5.9)
WBC # BLD AUTO: 9.2 10E3/UL (ref 4–11)

## 2024-03-26 PROCEDURE — 250N000011 HC RX IP 250 OP 636: Performed by: EMERGENCY MEDICINE

## 2024-03-26 PROCEDURE — 96374 THER/PROPH/DIAG INJ IV PUSH: CPT

## 2024-03-26 PROCEDURE — 258N000003 HC RX IP 258 OP 636: Performed by: EMERGENCY MEDICINE

## 2024-03-26 PROCEDURE — 36415 COLL VENOUS BLD VENIPUNCTURE: CPT | Performed by: EMERGENCY MEDICINE

## 2024-03-26 PROCEDURE — 82565 ASSAY OF CREATININE: CPT | Performed by: EMERGENCY MEDICINE

## 2024-03-26 PROCEDURE — C9113 INJ PANTOPRAZOLE SODIUM, VIA: HCPCS | Performed by: EMERGENCY MEDICINE

## 2024-03-26 PROCEDURE — 85025 COMPLETE CBC W/AUTO DIFF WBC: CPT | Performed by: EMERGENCY MEDICINE

## 2024-03-26 PROCEDURE — 99285 EMERGENCY DEPT VISIT HI MDM: CPT | Mod: 25

## 2024-03-26 RX ADMIN — SODIUM CHLORIDE, POTASSIUM CHLORIDE, SODIUM LACTATE AND CALCIUM CHLORIDE 1000 ML: 600; 310; 30; 20 INJECTION, SOLUTION INTRAVENOUS at 23:44

## 2024-03-26 RX ADMIN — PANTOPRAZOLE SODIUM 40 MG: 40 INJECTION, POWDER, FOR SOLUTION INTRAVENOUS at 23:44

## 2024-03-26 ASSESSMENT — COLUMBIA-SUICIDE SEVERITY RATING SCALE - C-SSRS
2. HAVE YOU ACTUALLY HAD ANY THOUGHTS OF KILLING YOURSELF IN THE PAST MONTH?: NO
6. HAVE YOU EVER DONE ANYTHING, STARTED TO DO ANYTHING, OR PREPARED TO DO ANYTHING TO END YOUR LIFE?: NO
1. IN THE PAST MONTH, HAVE YOU WISHED YOU WERE DEAD OR WISHED YOU COULD GO TO SLEEP AND NOT WAKE UP?: NO

## 2024-03-26 ASSESSMENT — ACTIVITIES OF DAILY LIVING (ADL): ADLS_ACUITY_SCORE: 33

## 2024-03-26 ASSESSMENT — ENCOUNTER SYMPTOMS
BLOOD IN STOOL: 1
ABDOMINAL PAIN: 1
RECTAL PAIN: 1
ANAL BLEEDING: 1

## 2024-03-27 ENCOUNTER — DOCUMENTATION ONLY (OUTPATIENT)
Dept: OTHER | Facility: CLINIC | Age: 29
End: 2024-03-27

## 2024-03-27 VITALS
TEMPERATURE: 98 F | RESPIRATION RATE: 20 BRPM | WEIGHT: 215 LBS | SYSTOLIC BLOOD PRESSURE: 136 MMHG | BODY MASS INDEX: 27.59 KG/M2 | HEIGHT: 74 IN | HEART RATE: 70 BPM | DIASTOLIC BLOOD PRESSURE: 86 MMHG | OXYGEN SATURATION: 98 %

## 2024-03-27 PROBLEM — K92.1 MELENA: Status: ACTIVE | Noted: 2024-03-27

## 2024-03-27 PROBLEM — K64.4 EXTERNAL HEMORRHOIDS: Status: ACTIVE | Noted: 2024-03-27

## 2024-03-27 LAB
ALBUMIN SERPL BCG-MCNC: 4.9 G/DL (ref 3.5–5.2)
ALP SERPL-CCNC: 58 U/L (ref 40–150)
ALT SERPL W P-5'-P-CCNC: 76 U/L (ref 0–70)
ANION GAP SERPL CALCULATED.3IONS-SCNC: 12 MMOL/L (ref 7–15)
ANION GAP SERPL CALCULATED.3IONS-SCNC: 15 MMOL/L (ref 7–15)
AST SERPL W P-5'-P-CCNC: 39 U/L (ref 0–45)
BILIRUB SERPL-MCNC: 0.9 MG/DL
BUN SERPL-MCNC: 10.4 MG/DL (ref 6–20)
BUN SERPL-MCNC: 10.4 MG/DL (ref 6–20)
CALCIUM SERPL-MCNC: 9.1 MG/DL (ref 8.6–10)
CALCIUM SERPL-MCNC: 9.2 MG/DL (ref 8.6–10)
CHLORIDE SERPL-SCNC: 100 MMOL/L (ref 98–107)
CHLORIDE SERPL-SCNC: 105 MMOL/L (ref 98–107)
CREAT SERPL-MCNC: 1.06 MG/DL (ref 0.67–1.17)
CREAT SERPL-MCNC: 1.1 MG/DL (ref 0.67–1.17)
DEPRECATED HCO3 PLAS-SCNC: 23 MMOL/L (ref 22–29)
DEPRECATED HCO3 PLAS-SCNC: 23 MMOL/L (ref 22–29)
EGFRCR SERPLBLD CKD-EPI 2021: >90 ML/MIN/1.73M2
EGFRCR SERPLBLD CKD-EPI 2021: >90 ML/MIN/1.73M2
ERYTHROCYTE [DISTWIDTH] IN BLOOD BY AUTOMATED COUNT: 11.6 % (ref 10–15)
GLUCOSE SERPL-MCNC: 100 MG/DL (ref 70–99)
GLUCOSE SERPL-MCNC: 88 MG/DL (ref 70–99)
HCT VFR BLD AUTO: 46 % (ref 40–53)
HGB BLD-MCNC: 16.9 G/DL (ref 13.3–17.7)
INR PPP: 1.06 (ref 0.85–1.15)
MCH RBC QN AUTO: 31.5 PG (ref 26.5–33)
MCHC RBC AUTO-ENTMCNC: 36.7 G/DL (ref 31.5–36.5)
MCV RBC AUTO: 86 FL (ref 78–100)
PLATELET # BLD AUTO: 227 10E3/UL (ref 150–450)
POTASSIUM SERPL-SCNC: 3.6 MMOL/L (ref 3.4–5.3)
POTASSIUM SERPL-SCNC: 4 MMOL/L (ref 3.4–5.3)
PROT SERPL-MCNC: 8.2 G/DL (ref 6.4–8.3)
RBC # BLD AUTO: 5.36 10E6/UL (ref 4.4–5.9)
SODIUM SERPL-SCNC: 138 MMOL/L (ref 135–145)
SODIUM SERPL-SCNC: 140 MMOL/L (ref 135–145)
WBC # BLD AUTO: 6.6 10E3/UL (ref 4–11)

## 2024-03-27 PROCEDURE — 85027 COMPLETE CBC AUTOMATED: CPT | Performed by: INTERNAL MEDICINE

## 2024-03-27 PROCEDURE — 250N000011 HC RX IP 250 OP 636: Performed by: INTERNAL MEDICINE

## 2024-03-27 PROCEDURE — 80048 BASIC METABOLIC PNL TOTAL CA: CPT | Performed by: INTERNAL MEDICINE

## 2024-03-27 PROCEDURE — 36415 COLL VENOUS BLD VENIPUNCTURE: CPT | Performed by: INTERNAL MEDICINE

## 2024-03-27 PROCEDURE — 99222 1ST HOSP IP/OBS MODERATE 55: CPT | Performed by: INTERNAL MEDICINE

## 2024-03-27 PROCEDURE — 250N000013 HC RX MED GY IP 250 OP 250 PS 637: Performed by: INTERNAL MEDICINE

## 2024-03-27 PROCEDURE — G0378 HOSPITAL OBSERVATION PER HR: HCPCS

## 2024-03-27 PROCEDURE — 96376 TX/PRO/DX INJ SAME DRUG ADON: CPT

## 2024-03-27 PROCEDURE — 85610 PROTHROMBIN TIME: CPT | Performed by: INTERNAL MEDICINE

## 2024-03-27 PROCEDURE — C9113 INJ PANTOPRAZOLE SODIUM, VIA: HCPCS | Performed by: INTERNAL MEDICINE

## 2024-03-27 PROCEDURE — 96361 HYDRATE IV INFUSION ADD-ON: CPT

## 2024-03-27 RX ORDER — AMOXICILLIN 250 MG
2 CAPSULE ORAL 2 TIMES DAILY PRN
Status: DISCONTINUED | OUTPATIENT
Start: 2024-03-27 | End: 2024-03-27 | Stop reason: HOSPADM

## 2024-03-27 RX ORDER — ONDANSETRON 2 MG/ML
4 INJECTION INTRAMUSCULAR; INTRAVENOUS EVERY 6 HOURS PRN
Status: DISCONTINUED | OUTPATIENT
Start: 2024-03-27 | End: 2024-03-27 | Stop reason: HOSPADM

## 2024-03-27 RX ORDER — AMOXICILLIN 250 MG
1 CAPSULE ORAL 2 TIMES DAILY PRN
Status: DISCONTINUED | OUTPATIENT
Start: 2024-03-27 | End: 2024-03-27 | Stop reason: HOSPADM

## 2024-03-27 RX ORDER — PANTOPRAZOLE SODIUM 20 MG/1
20 TABLET, DELAYED RELEASE ORAL DAILY
Qty: 30 TABLET | Refills: 0 | Status: SHIPPED | OUTPATIENT
Start: 2024-03-27

## 2024-03-27 RX ORDER — ONDANSETRON 4 MG/1
4 TABLET, ORALLY DISINTEGRATING ORAL EVERY 6 HOURS PRN
Status: DISCONTINUED | OUTPATIENT
Start: 2024-03-27 | End: 2024-03-27 | Stop reason: HOSPADM

## 2024-03-27 RX ORDER — PANTOPRAZOLE SODIUM 40 MG/1
40 TABLET, DELAYED RELEASE ORAL
Status: DISCONTINUED | OUTPATIENT
Start: 2024-03-27 | End: 2024-03-27 | Stop reason: HOSPADM

## 2024-03-27 RX ORDER — ACETAMINOPHEN 500 MG
2000 TABLET ORAL EVERY OTHER DAY
COMMUNITY
End: 2024-03-27

## 2024-03-27 RX ORDER — POLYETHYLENE GLYCOL 3350 17 G
4 POWDER IN PACKET (EA) ORAL
Status: DISCONTINUED | OUTPATIENT
Start: 2024-03-27 | End: 2024-03-27 | Stop reason: HOSPADM

## 2024-03-27 RX ORDER — BENZOCAINE/MENTHOL 6 MG-10 MG
LOZENGE MUCOUS MEMBRANE 2 TIMES DAILY
COMMUNITY

## 2024-03-27 RX ADMIN — THIAMINE HCL TAB 100 MG 100 MG: 100 TAB at 08:34

## 2024-03-27 RX ADMIN — PANTOPRAZOLE SODIUM 40 MG: 40 INJECTION, POWDER, FOR SOLUTION INTRAVENOUS at 08:34

## 2024-03-27 ASSESSMENT — ACTIVITIES OF DAILY LIVING (ADL)
ADLS_ACUITY_SCORE: 18

## 2024-03-27 ASSESSMENT — ENCOUNTER SYMPTOMS
RHINORRHEA: 0
LIGHT-HEADEDNESS: 1
HEADACHES: 0
DYSURIA: 0
SHORTNESS OF BREATH: 0
COUGH: 0
CHILLS: 1
FEVER: 0
HEMATURIA: 0

## 2024-03-27 NOTE — PLAN OF CARE
ROOM # ED 18  Living Situation : Home with grandparents   Facility name:  : Sondra ( Mom) [phone]    Activity level at baseline: Independent   Activity level on admit: Independent     Who will be transporting you at discharge: Salvatore ( Grandparent)     Patient registered to observation; given Patient Bill of Rights; given the opportunity to ask questions about observation status and their plan of care.  Patient has been oriented to the observation room, bathroom and call light is in place.    Discussed discharge goals and expectations with patient/family.

## 2024-03-27 NOTE — ED TRIAGE NOTES
Patient seen last Friday for hemorroids at urgent care and feels like it is getting worse. Going through underwear and sheets. Per patient feels like stool is like tar.      Triage Assessment (Adult)       Row Name 03/26/24 8619          Triage Assessment    Airway WDL WDL        Respiratory WDL    Respiratory WDL WDL        Skin Circulation/Temperature WDL    Skin Circulation/Temperature WDL WDL        Cardiac WDL    Cardiac WDL WDL        Peripheral/Neurovascular WDL    Peripheral Neurovascular WDL WDL        Cognitive/Neuro/Behavioral WDL    Cognitive/Neuro/Behavioral WDL WDL

## 2024-03-27 NOTE — PLAN OF CARE
"PRIMARY DIAGNOSIS: Gastrointestinal bleeding     OUTPATIENT/OBSERVATION GOALS TO BE MET BEFORE DISCHARGE  Orthostatic performed: No    Stable Hgb Yes.   Recent Labs   Lab Test 03/26/24  2342 01/02/17  1001 01/01/17  0425   HGB 17.1 16.1 16.9       Resolved or declined bleeding episodes: Yes Last episode:     Appropriate testing complete: No    Cleared for discharge by consultants (if involved): No    Safe discharge environment identified: Yes    Discharge Planner Nurse   Safe discharge environment identified: Yes  Barriers to discharge: Yes  A & O X 4. Lung sounds clear bilaterally to auscultation. No wheezes or rales ausculted. Denies pain,chills, abdominal pain, nausea, vomit, or diarrhea. Denies dark/ tarry colored stool this shift. Bowel sounds present all quads and active. NPO status. PIV saline lock. Afebrile. GI consulted.  /69 (BP Location: Left arm)   Pulse 87   Temp 98  F (36.7  C) (Oral)   Resp 16   Ht 1.88 m (6' 2\")   Wt 97.5 kg (215 lb)   SpO2 94%   BMI 27.60 kg/m           Entered by: Mojgan Blake RN 03/27/2024 5:29 AM     Problem: Adult Inpatient Plan of Care  Goal: Plan of Care Review  Description: The Plan of Care Review/Shift note should be completed every shift.  The Outcome Evaluation is a brief statement about your assessment that the patient is improving, declining, or no change.  This information will be displayed automatically on your shift  note.  Outcome: Progressing  Goal: Patient-Specific Goal (Individualized)  Description: You can add care plan individualizations to a care plan. Examples of Individualization might be:  \"Parent requests to be called daily at 9am for status\", \"I have a hard time hearing out of my right ear\", or \"Do not touch me to wake me up as it startles  me\".  Outcome: Progressing  Goal: Absence of Hospital-Acquired Illness or Injury  Outcome: Progressing  Goal: Optimal Comfort and Wellbeing  Outcome: Progressing  Goal: Readiness for Transition of " Care  Outcome: Progressing  Intervention: Mutually Develop Transition Plan  Recent Flowsheet Documentation  Taken 3/27/2024 0058 by Mojgan Blake RN  Equipment Currently Used at Home: none     Please review provider order for any additional goals.   Nurse to notify provider when observation goals have been met and patient is ready for discharge.

## 2024-03-27 NOTE — PHARMACY-ADMISSION MEDICATION HISTORY
Pharmacist Admission Medication History    Admission medication history is complete. The information provided in this note is only as accurate as the sources available at the time of the update.    Information Source(s): Patient and CareEverywhere/SureScripts via in-person    Pertinent Information:      Changes made to PTA medication list:  Added: All  Deleted: Zoloft & Trazodone  Changed: None    Allergies reviewed with patient and updates made in EHR: yes    Medication History Completed By: Magui Siegel PharmD 3/27/2024 9:19 AM    PTA Med List   Medication Sig Last Dose    acetaminophen (TYLENOL) 500 MG tablet Take 2,000 mg by mouth every other day 3/24/2024    hydrocortisone (CORTAID) 1 % external cream Apply topically 2 times daily 3/26/2024    Lidocaine 4 % GEL Externally apply topically every 6 hours as needed (pain) 3/26/2024

## 2024-03-27 NOTE — H&P
[facility]       Hospitalist History & Physical     Assessment & Plan     ASSESSMENT    28M with history of alcohol abuse and tobacco abuse presents with rectal bleeding.  Apparently bleeding initially bright red and had external hemorrhoid present on exam at urgent care last week so sent home with hydrocortisone and lidocaine ointment.  Now saying blood is more dark and tarry in color.  Does have risk factors for PUD given ibuprofen and alcohol abuse.  Hemoglobin stable.  Workup and treatment per below.    PLAN    Gastrointestinal Bleeding  Known External Hemorrhoid   Alcohol and NSAID Abuse  -Presents with 1 week of rectal bleeding  -Initially bright red but now dark and tarry in color  -Does have external hemorrhoids on exam but not bleeding currently  -History of alcohol and NSAID abuse, although no history of cirrhosis and quite young for this so low suspicion for variceal bleeding  PLAN  -PPI IV bid  -Trend Hg  -NPO for GI consultation    Alcohol Use/Abuse  -No history of withdrawal so we will hold off on CIWA but monitor closely  -Thiamine 100 mg daily    Tobacco Abuse  - cessation  -Nicotine replacement    DVT Prophy  -SCDs    Disposition  -Observation unit      Gallo Patrick MD    History of Present Illness     Brady Sullivan is a 28 year old with history of alcohol abuse and tobacco abuse presents with rectal bleeding.  Patient was in his normal state of health until about a week ago when he started noting bright red blood coming from his rectum.  Was seen in urgent care 03/22 and noted to have an external hemorrhoid as probable source of bleeding.  Prescribed 1% hydrocortisone cream and lidocaine cream that he has been taking as prescribed.  Still having a lot of blood.  Initially was bright red but now saying that he has dark and tarry blood when he is wiping.  He is taking both Tylenol and ibuprofen frequently for her recent knee injury and wisdom teeth removal.  Also history of  "alcohol abuse.  Drinks about 4 beers per day.  No history of cirrhosis or withdrawal.  Mild epigastric abdominal pain.  Denies lightheadedness or dizziness.  In the ED, VSS.  WBC 17.1.  Rectal exam with nonbleeding external hemorrhoid, no other stool noted in rectal vault.  Given PPI and admitted to medicine.    Review of Systems     A Comprehensive greater than 10 system review of systems was carried out.  Pertinent positives and negatives are noted above.  Otherwise negative for contributory information.     Past Medical History     Past Medical History:   Diagnosis Date    Alcohol abuse     Depression      Medications     Current Outpatient Medications   Medication Sig Dispense Refill    sertraline (ZOLOFT) 50 MG tablet Take one-half tab daily for 6 days then increase to one tab daily. 30 tablet 1    traZODone (DESYREL) 50 MG tablet Take 1 tablet (50 mg) by mouth nightly as needed for sleep 30 tablet 1      Past Surgical History   History reviewed. No pertinent surgical history.     Family History   History reviewed. No pertinent family history.    Allergies   No Known Allergies    Social History     Social History     Tobacco Use    Smoking status: Light Smoker    Smokeless tobacco: Current   Substance Use Topics    Alcohol use: Yes     Comment: 4 times per week at [facility]     Physical Exam   Blood pressure (!) 151/70, pulse 105, temperature 97.9  F (36.6  C), temperature source Temporal, resp. rate 20, height 1.88 m (6' 2\"), weight 97.5 kg (215 lb), SpO2 96%.    General: Ill appearing, cooperative with exam, in NAD.  HEENT: Atraumatic. No erythema in posterior pharynx.  Lymph: No cervical or inguinal lymphadenopathy.  Cardiac: RRR. No murmurs.  Lungs: CTAB. Nl WOB.  Abd: Non-tender. No rebound or gaurding. Nl bowel sounds.  Ext: No edema. 2+ pulses.  Skin: No rashes, abrasions, or contusions.  Psych: A&Ox3. Nl affect.  Neuro: 5/5 strength. Sensation intact.    Labs & Imaging     Reviewed and Pertinent results " discussed in assessment and plan.

## 2024-03-27 NOTE — ED PROVIDER NOTES
History     Chief Complaint:  Rectal Bleeding     The history is provided by the patient.      Brady Sullivan is a 28 year old male with a history of alcohol abuse, who presents to the ED for black stools, ongoing external hemorrhoid with bloody stools, rectal pain, and abdominal pain. Patient reports his symptoms has been ongoing for approximately 5 days, but abdominal pain was only present yesterday. The patient explains his symptoms began as lightheadedness and chills when passing stool which presented him to Urgent Care on 3/22 and was diagnosed with an external hemorrhoid. He reports rectal pain and dripping blood, as well as a black tarry substance when wiping for the past 2 days, prompting him to the ED today. Reports left sided abdominal pain last night. Reports one bowel movement earlier today that was bloody. He is still having black tarry stools. Reports administering lidocaine and hydrocortisone cream 1% and 3 tablets of ibuprofen with minimal relief. Reports drinking 4 beers today and states he drinks 1-3 beers daily on average. He is also still taking ibuprofen daily for his knee pain. He currently denies hematuria, dysuria, fever, runny nose, congestion, cough, nausea, vomiting, abdominal pain, neck pain or back pain.     Review of Systems   Constitutional:  Positive for chills. Negative for fever.   HENT:  Negative for congestion and rhinorrhea.    Respiratory:  Negative for cough and shortness of breath.    Cardiovascular:  Negative for chest pain.   Gastrointestinal:  Positive for abdominal pain, anal bleeding, blood in stool and rectal pain.   Genitourinary:  Negative for dysuria and hematuria.   Neurological:  Positive for light-headedness. Negative for headaches.     Independent Historian:   None - Patient Only    Review of External Notes:   Reviewed office visit note from 03/22/24.      Medications:    Zoloft  Desyrel    Past Medical History:    Alcohol abuse  Depression    Physical Exam  "  Patient Vitals for the past 24 hrs:   BP Temp Temp src Pulse Resp SpO2 Height Weight   03/27/24 0055 125/69 98  F (36.7  C) Oral 87 16 94 % -- --   03/26/24 2213 (!) 151/70 97.9  F (36.6  C) Temporal 105 20 96 % -- --   03/26/24 2211 -- -- -- -- -- -- 1.88 m (6' 2\") 97.5 kg (215 lb)      Constitutional:       General: Not in acute distress.        Appearance: Normal appearance.   HENT:      Head: Normocephalic and atraumatic.   Eyes:      Extraocular Movements: Extraocular movements intact.      Conjunctiva/sclera: Conjunctivae normal.   Cardiovascular:      Rate and Rhythm: Normal rate and regular rhythm.   Pulmonary:      Effort: Pulmonary effort is normal. No respiratory distress.      Breath sounds: Normal breath sounds.   Abdominal:      General: Abdomen is flat. There is no distension.      Palpations: Abdomen is soft.      Tenderness: There is no abdominal tenderness.   Anorectal:      General: Large 3:00 positioning external hemorrhoid that is flesh-colored.  No active bleeding observed.  Some small amounts of red blood around the rectum.  No active bleeding observed.  No obvious melena.  Musculoskeletal:      Cervical back: Normal range of motion. No rigidity.      Right lower leg: No edema.      Left lower leg: No edema.   Skin:     General: Skin is warm and dry.   Neurological:      General: No focal deficit present.      Mental Status: Alert and oriented to person, place, and time.   Psychiatric:         Mood and Affect: Mood normal.         Behavior: Behavior normal.    Emergency Department Course     Laboratory:  Labs Ordered and Resulted from Time of ED Arrival to Time of ED Departure   COMPREHENSIVE METABOLIC PANEL - Abnormal       Result Value    Sodium 138      Potassium 3.6      Carbon Dioxide (CO2) 23      Anion Gap 15      Urea Nitrogen 10.4      Creatinine 1.10      GFR Estimate >90      Calcium 9.1      Chloride 100      Glucose 100 (*)     Alkaline Phosphatase 58      AST 39      ALT 76 (*) "     Protein Total 8.2      Albumin 4.9      Bilirubin Total 0.9     CBC WITH PLATELETS AND DIFFERENTIAL - Abnormal    WBC Count 9.2      RBC Count 5.44      Hemoglobin 17.1      Hematocrit 46.3      MCV 85      MCH 31.4      MCHC 36.9 (*)     RDW 11.5      Platelet Count 233      % Neutrophils 46      % Lymphocytes 41      % Monocytes 8      % Eosinophils 3      % Basophils 1      % Immature Granulocytes 1      NRBCs per 100 WBC 0      Absolute Neutrophils 4.4      Absolute Lymphocytes 3.7      Absolute Monocytes 0.7      Absolute Eosinophils 0.3      Absolute Basophils 0.1      Absolute Immature Granulocytes 0.1      Absolute NRBCs 0.0     INR   BASIC METABOLIC PANEL   CBC WITH PLATELETS      Emergency Department Course & Assessments:       Interventions:  Medications   nicotine (COMMIT) lozenge 4 mg (has no administration in time range)   thiamine (B-1) tablet 100 mg (has no administration in time range)   pantoprazole (PROTONIX) IV push injection 40 mg (40 mg Intravenous $Given 3/26/24 2344)   lactated ringers BOLUS 1,000 mL (1,000 mLs Intravenous $New Bag 3/26/24 2344)     Independent Interpretation (X-rays, CTs, rhythm strip):  None    Assessments/Consultations/Discussion of Management or Tests:  ED Course as of 03/27/24 0647   Tue Mar 26, 2024   2315 I obtained history and examined the patient as noted above.     Wed Mar 27, 2024   0010 Hemoglobin: 17.1  Re-assuring   0017 Patient high risk with Glascow Blatchford bleeding score with 1 point.     0018 I spoke with the patient.  Family present at bedside.  Patient endorsing that he has been having black stools for multiple days with associated lightheadedness.  No clear melena on examination today, but there was not a significant amount of stool on my exam.  After shared decision discussion including discussion of patient's Glascow Blatchford bleeding score and offered either observation admission or close outpatient follow-up with colorectal surgery and  gastroenterology, patient and family feels more comfortable with observation admission at this time.  I will consult hospitalist for admission and potential gastroenterology consult next day.   0030 Discussed patient with hospitalist Dr. Patrick who accepted patient for admission.     Social Determinants of Health affecting care:   None    Disposition:  Patient admitted to the hospital under Dr. Pham.     Impression & Plan    CMS Diagnoses: None    Medical Decision Makin-year-old male as described above presents to the emergency department for continued rectal bleeding, external hemorrhoid, and recent black tarry stools for the past few days.  Patient does have a history of daily alcohol usage in addition to continued ibuprofen usage due to knee pain.  Patient hemodynamically stable at time of evaluation.  Afebrile.  Mildly tachycardic.  Patient did endorse drinking alcohol prior to arrival today roughly 4 beers.  No prior history of esophageal varices or GI bleeding.  On examination, patient does have a large flesh-colored external hemorrhoid in the 3 o'clock positioning.  No active bleeding observed.  Digital rectal exam does not demonstrate obvious melena.  Nonetheless, patient is reporting that he has been having melanotic stools.  Will obtain CBC and CMP for evaluation for acute anemia or significant elevated BUN.  Suspect possibility of peptic ulcer disease as contributing cause of melanotic stools.  Will give IV Protonix.  IV fluid hydration.  At this time, patient has no abdominal pain.  No indications for CT imaging at this time.  If evidence of anemia or significant BUN elevation or liver enzyme dysfunction, consider admission for GI consultation for possible endoscopy given concern for upper GI bleeding due to alcohol use/ibuprofen usage.  Otherwise, hemodynamically stable and low risk upper GI score, will discharge to outpatient colorectal surgery referral for further hemorrhoid care.   Discussed care plan patient voiced understanding and agreement with plan.  Answered all questions.  Additional workup and orders as listed in chart.     Please refer to ED course above as part of continuation of MDM for details on the patient's treatment course and any changes or updates in care plan beyond my initial evaluation and MDM creation.      Diagnosis:    ICD-10-CM    1. Melena  K92.1       2. External hemorrhoids  K64.4                Scribe Disclosure:  I, Addie Eaton, am serving as a scribe at 11:12 PM on 3/26/2024 to document services personally performed by Jeffry Michael DO based on my observations and the provider's statements to me.   3/26/2024   Jeffry Michael DO Yeh, Ferris, DO  03/27/24 0648

## 2024-03-27 NOTE — CONSULTS
GASTROENTEROLOGY CONSULTATION      Brady Sullivan  [id number] [facility] [address]  28 year old male     Admission Date/Time: 3/26/2024  Primary Care Provider: No Ref-Primary, Physician  Referring / Attending Physician:  Dr. Barrera     We were asked to see the patient in consultation by Dr. Barrera for evaluation of gi bleeding.        HPI:  Brady Sullivan is a 28 year old male with history of alcohol use and chewing tobacco who presents with red blood per rectum.    Patient reports he began seeing red blood about 5 days ago.  He would go to make a bowel movement and then noticed red blood on the toilet paper.  He also found that he would bleed spontaneously just while lying in bed.  About 2 days ago he noticed some darker colored stool that he passed.  He has been slightly more constipated having bowel movements only every other day.  He has not taken anything for constipation.  Last week he was seen in urgent care and was found to have external hemorrhoids.  It was recommended that he use steroid cream and lidocaine ointment.  Patient tells me that he has only been using over-the-counter Tylenol.  He has been using this for headaches.  He has not been using ibuprofen.  He reports that he will drink alcohol primarily beer most days of the week.  He denies any known history of liver disease.    The patient is unaware of any family history of colon polyps or colon cancer.  In the emergency room his hemoglobin is normal at 17.  MCV is 85.  ALT is slightly elevated at 76, AST normal at 39, total bilirubin normal at 0.9.  Kidney function electrolytes are normal.       PAST MEDICAL HISTORY:  Patient Active Problem List    Diagnosis Date Noted    External hemorrhoids 03/27/2024     Priority: Medium    Melena 03/27/2024     Priority: Medium    Major depressive disorder, recurrent severe without psychotic features (H) 01/01/2017     Priority: Medium    Chewing tobacco nicotine dependence with withdrawal 01/01/2017      "Priority: Medium    Alcohol consumption binge drinking 01/01/2017     Priority: Medium          ROS: A comprehensive ten point review of systems was negative aside from those in mentioned in the HPI.       MEDICATIONS:   Prior to Admission medications    Medication Sig Start Date End Date Taking? Authorizing Provider   sertraline (ZOLOFT) 50 MG tablet Take one-half tab daily for 6 days then increase to one tab daily. 1/2/17   Haja Moon MD   traZODone (DESYREL) 50 MG tablet Take 1 tablet (50 mg) by mouth nightly as needed for sleep 1/2/17   Haja Moon MD        ALLERGIES: No Known Allergies     SOCIAL HISTORY:  Social History     Tobacco Use    Smoking status: Light Smoker    Smokeless tobacco: Current   Substance Use Topics    Alcohol use: Yes     Comment: 4 times per week at [facility]    Drug use: Not Currently        FAMILY HISTORY:  No known history of colon polyps or colon cancer     PHYSICAL EXAM:     /54   Pulse 82   Temp 98.6  F (37  C) (Oral)   Resp 18   Ht 1.88 m (6' 2\")   Wt 97.5 kg (215 lb)   SpO2 98%   BMI 27.60 kg/m       PHYSICAL EXAM:  GENERAL: NAD  SKIN: no suspicious lesions, rashes, jaundice  HEAD: Normocephalic. Atraumatic.  NECK: Neck supple. No adenopathy.   EYES: No scleral icterus  GASTROINTESTINAL: +BS, soft, non tender, non distended, no guarding/rebound  JOINT/EXTREMITIES:  no gross deformities noted, normal muscle tone  NEURO: CN 2-12 grossly intact, no focal deficits  PSYCH: Normal affect          ADDITIONAL COMMENTS:   I reviewed the patient's new clinical lab test results.     Recent Labs   Lab 03/26/24 2342   WBC 9.2   RBC 5.44   HGB 17.1   HCT 46.3   MCV 85   MCH 31.4   MCHC 36.9*   RDW 11.5        Recent Labs   Lab Test 03/26/24  2342 01/02/17  1001 01/01/17  0425   POTASSIUM 3.6 3.9 3.5   CHLORIDE 100 107 107   CO2 23 31 28   BUN 10.4 17 13   ANIONGAP 15 5 8     Recent Labs   Lab Test 03/26/24  2342 01/02/17  1001 01/01/17  0425 01/01/17  0410 "   ALBUMIN 4.9 4.0 4.7  --    BILITOTAL 0.9 1.5* 0.9  --    ALT 76* 24 24  --    AST 39 18 26  --    PROTEIN  --   --   --  Negative          CONSULTATION ASSESSMENT AND PLAN:    Brady Sullivan is a 28 year old with history of alcohol use and chewing tobacco who presents with red blood per rectum and reports of dark-colored stool.    1.  GI bleeding: Patient does have external hemorrhoids that were nonbleeding on rectal exam in the ER.  With normal hemoglobin and normal BUN, less likely any upper GI bleeding.  I suspect that he is constipated.  He has no abdominal pain.  No unintentional weight loss.  No known family history of colon polyps or colon cancer.  He is hemodynamically stable.  Given change in bowel movements with rectal bleeding would recommend colonoscopy for further surveillance.    -- Recommend outpatient colonoscopy  -- My office will call the patient to schedule  -- Patient is stable for discharge and agreeable to the plan going forward      I discussed the patient plan with Dr. Ritter, GI staff physician. Thank you for asking us to participate in the care of this patient.     70 min of total time was spent providing patient care, including patient evaluation, reviewing documentation/ test results, and .     Cait Keene PA-C  [facility] ( [facility])   ------------  I did not see the patient today as he was discharging before I had a chance to see him.  Birsa Ritter MD

## 2024-03-27 NOTE — PLAN OF CARE
"PRIMARY DIAGNOSIS: Gastrointestinal bleeding     OUTPATIENT/OBSERVATION GOALS TO BE MET BEFORE DISCHARGE  Orthostatic performed: No    Stable Hgb Yes.   Recent Labs   Lab Test 03/26/24  2342 01/02/17  1001 01/01/17  0425   HGB 17.1 16.1 16.9       Resolved or declined bleeding episodes: Yes Last episode:     Appropriate testing complete: No    Cleared for discharge by consultants (if involved): No    Safe discharge environment identified: Yes    Discharge Planner Nurse   Safe discharge environment identified: Yes  Barriers to discharge: Yes  A & O X 4. Lung sounds clear bilaterally to auscultation. No wheezes or rales ausculted. Denies pain,chills, abdominal pain, nausea, vomit, or diarrhea. Denies dark/ tarry colored stool this shift. Bowel sounds present all quads and active. PIV saline lock. Afebrile. GI consulted.  /69 (BP Location: Left arm)   Pulse 87   Temp 98  F (36.7  C) (Oral)   Resp 16   Ht 1.88 m (6' 2\")   Wt 97.5 kg (215 lb)   SpO2 94%   BMI 27.60 kg/m           Entered by: Mojgan Blake RN 03/27/2024 5:24 AM     Problem: Adult Inpatient Plan of Care  Goal: Plan of Care Review  Description: The Plan of Care Review/Shift note should be completed every shift.  The Outcome Evaluation is a brief statement about your assessment that the patient is improving, declining, or no change.  This information will be displayed automatically on your shift  note.  Outcome: Progressing  Goal: Patient-Specific Goal (Individualized)  Description: You can add care plan individualizations to a care plan. Examples of Individualization might be:  \"Parent requests to be called daily at 9am for status\", \"I have a hard time hearing out of my right ear\", or \"Do not touch me to wake me up as it startles  me\".  Outcome: Progressing  Goal: Absence of Hospital-Acquired Illness or Injury  Outcome: Progressing  Goal: Optimal Comfort and Wellbeing  Outcome: Progressing  Goal: Readiness for Transition of Care  Outcome: " Progressing  Intervention: Mutually Develop Transition Plan  Recent Flowsheet Documentation  Taken 3/27/2024 0058 by Mojgan Blake RN  Equipment Currently Used at Home: none     Please review provider order for any additional goals.   Nurse to notify provider when observation goals have been met and patient is ready for discharge.

## 2024-03-27 NOTE — ED NOTES
"[facility]  ED Nurse Handoff Report    ED Chief complaint: Rectal Bleeding  . ED Diagnosis:   Final diagnoses:   Melena   External hemorrhoids       Allergies: No Known Allergies    Code Status: Full Code    Activity level - Baseline/Home:  independent.  Activity Level - Current:   standby.   Lift room needed: No.   Bariatric: No   Needed: No   Isolation: No.   Infection: Not Applicable.     Respiratory status: Room air    Vital Signs (within 30 minutes):   Vitals:    03/26/24 2211 03/26/24 2213   BP:  (!) 151/70   Pulse:  105   Resp:  20   Temp:  97.9  F (36.6  C)   TempSrc:  Temporal   SpO2:  96%   Weight: 97.5 kg (215 lb)    Height: 1.88 m (6' 2\")        Cardiac Rhythm:  ,      Pain level:    Patient confused: No.   Patient Falls Risk: patient and family education and activity supervised.   Elimination Status:  not in ED      Patient Report - Initial Complaint: rectal bleeding.   Focused Assessment: 28-year-old male as described above presents to the emergency department for continued rectal bleeding, external hemorrhoid, and recent black tarry stools for the past few days.  Patient does have a history of daily alcohol usage in addition to continued ibuprofen usage due to knee pain.  Patient hemodynamically stable at time of evaluation.  Afebrile.  Mildly tachycardic.  Patient did endorse drinking alcohol prior to arrival today roughly 4 beers.  No prior history of esophageal varices or GI bleeding.  On examination, patient does have a large flesh-colored external hemorrhoid in the 3 o'clock positioning.  No active bleeding observed.  Digital rectal exam does not demonstrate obvious melena.  Nonetheless, patient is reporting that he has been having melanotic stools.  Will obtain CBC and CMP for evaluation for acute anemia or significant elevated BUN.  Suspect possibility of peptic ulcer disease as contributing cause of melanotic stools.  Will give IV Protonix.  IV fluid " hydration.  At this time, patient has no abdominal pain.  No indications for CT imaging at this time.  If evidence of anemia or significant BUN elevation or liver enzyme dysfunction, consider admission for GI consultation for possible endoscopy given concern for upper GI bleeding due to alcohol use/ibuprofen usage.  Otherwise, hemodynamically stable and low risk upper GI score, will discharge to outpatient colorectal surgery referral for further hemorrhoid care.  Discussed care plan patient voiced understanding and agreement with plan.  Answered all questions.  Additional workup and orders as listed in chart.     Abnormal Results:   Labs Ordered and Resulted from Time of ED Arrival to Time of ED Departure   COMPREHENSIVE METABOLIC PANEL - Abnormal       Result Value    Sodium 138      Potassium 3.6      Carbon Dioxide (CO2) 23      Anion Gap 15      Urea Nitrogen 10.4      Creatinine 1.10      GFR Estimate >90      Calcium 9.1      Chloride 100      Glucose 100 (*)     Alkaline Phosphatase 58      AST 39      ALT 76 (*)     Protein Total 8.2      Albumin 4.9      Bilirubin Total 0.9     CBC WITH PLATELETS AND DIFFERENTIAL - Abnormal    WBC Count 9.2      RBC Count 5.44      Hemoglobin 17.1      Hematocrit 46.3      MCV 85      MCH 31.4      MCHC 36.9 (*)     RDW 11.5      Platelet Count 233      % Neutrophils 46      % Lymphocytes 41      % Monocytes 8      % Eosinophils 3      % Basophils 1      % Immature Granulocytes 1      NRBCs per 100 WBC 0      Absolute Neutrophils 4.4      Absolute Lymphocytes 3.7      Absolute Monocytes 0.7      Absolute Eosinophils 0.3      Absolute Basophils 0.1      Absolute Immature Granulocytes 0.1      Absolute NRBCs 0.0          No orders to display       Treatments provided: see MAR  Family Comments: at bedside  OBS brochure/video discussed/provided to patient:  Yes  ED Medications:   Medications   lactated ringers BOLUS 1,000 mL (1,000 mLs Intravenous $New Bag 3/26/24 1076)    pantoprazole (PROTONIX) IV push injection 40 mg (40 mg Intravenous $Given 3/26/24 5755)       Drips infusing:  Yes  For the majority of the shift this patient was Green.   Interventions performed were N/A.    Sepsis treatment initiated: No    Cares/treatment/interventions/medications to be completed following ED care: see inpatient admit orders.     ED Nurse Name: Courtney Adams RN  12:31 AM

## 2024-03-27 NOTE — PLAN OF CARE
Goal Outcome Evaluation:      Plan of Care Reviewed With: patient    Overall Patient Progress: improvingOverall Patient Progress: improving     Patient A&Ox4, tolerating regular diet. Ambulates independently. Denies pain or discomfort. VSS on room air. Denies any rectal bleeding at this time.     Patient's After Visit Summary was reviewed with patient.   Patient verbalized understanding of After Visit Summary, recommended follow up and was given an opportunity to ask questions.   Discharge medications sent home with patient/family: YES, No, Not applicable   Discharged with paternal grandfather

## 2024-10-31 ENCOUNTER — HOSPITAL ENCOUNTER (EMERGENCY)
Facility: CLINIC | Age: 29
Discharge: HOME | End: 2024-10-31
Attending: EMERGENCY MEDICINE | Admitting: EMERGENCY MEDICINE

## 2024-10-31 VITALS
RESPIRATION RATE: 18 BRPM | TEMPERATURE: 97.5 F | HEART RATE: 82 BPM | HEIGHT: 74 IN | BODY MASS INDEX: 32.08 KG/M2 | WEIGHT: 250 LBS | SYSTOLIC BLOOD PRESSURE: 121 MMHG | DIASTOLIC BLOOD PRESSURE: 76 MMHG | OXYGEN SATURATION: 96 %

## 2024-10-31 DIAGNOSIS — R07.89 RIGHT-SIDED CHEST WALL PAIN: ICD-10-CM

## 2024-10-31 LAB
ANION GAP SERPL CALCULATED.3IONS-SCNC: 15 MMOL/L (ref 7–15)
ATRIAL RATE - MUSE: 94 BPM
BASOPHILS # BLD AUTO: 0 10E3/UL (ref 0–0.2)
BASOPHILS NFR BLD AUTO: 1 %
BUN SERPL-MCNC: 15.3 MG/DL (ref 6–20)
CALCIUM SERPL-MCNC: 9.4 MG/DL (ref 8.8–10.4)
CHLORIDE SERPL-SCNC: 99 MMOL/L (ref 98–107)
CREAT SERPL-MCNC: 1.15 MG/DL (ref 0.67–1.17)
D DIMER PPP FEU-MCNC: <0.27 UG/ML FEU (ref 0–0.5)
DIASTOLIC BLOOD PRESSURE - MUSE: NORMAL MMHG
EGFRCR SERPLBLD CKD-EPI 2021: 88 ML/MIN/1.73M2
EOSINOPHIL # BLD AUTO: 0.2 10E3/UL (ref 0–0.7)
EOSINOPHIL NFR BLD AUTO: 4 %
ERYTHROCYTE [DISTWIDTH] IN BLOOD BY AUTOMATED COUNT: 11.5 % (ref 10–15)
GLUCOSE SERPL-MCNC: 97 MG/DL (ref 70–99)
HCO3 SERPL-SCNC: 23 MMOL/L (ref 22–29)
HCT VFR BLD AUTO: 48.2 % (ref 40–53)
HGB BLD-MCNC: 17.1 G/DL (ref 13.3–17.7)
HOLD SPECIMEN: NORMAL
IMM GRANULOCYTES # BLD: 0 10E3/UL
IMM GRANULOCYTES NFR BLD: 1 %
INTERPRETATION ECG - MUSE: NORMAL
LYMPHOCYTES # BLD AUTO: 1.7 10E3/UL (ref 0.8–5.3)
LYMPHOCYTES NFR BLD AUTO: 34 %
MCH RBC QN AUTO: 30.6 PG (ref 26.5–33)
MCHC RBC AUTO-ENTMCNC: 35.5 G/DL (ref 31.5–36.5)
MCV RBC AUTO: 86 FL (ref 78–100)
MONOCYTES # BLD AUTO: 0.6 10E3/UL (ref 0–1.3)
MONOCYTES NFR BLD AUTO: 11 %
NEUTROPHILS # BLD AUTO: 2.5 10E3/UL (ref 1.6–8.3)
NEUTROPHILS NFR BLD AUTO: 50 %
NRBC # BLD AUTO: 0 10E3/UL
NRBC BLD AUTO-RTO: 0 /100
P AXIS - MUSE: 59 DEGREES
PLATELET # BLD AUTO: 202 10E3/UL (ref 150–450)
POTASSIUM SERPL-SCNC: 4 MMOL/L (ref 3.4–5.3)
PR INTERVAL - MUSE: 196 MS
QRS DURATION - MUSE: 90 MS
QT - MUSE: 332 MS
QTC - MUSE: 415 MS
R AXIS - MUSE: 13 DEGREES
RBC # BLD AUTO: 5.58 10E6/UL (ref 4.4–5.9)
SODIUM SERPL-SCNC: 137 MMOL/L (ref 135–145)
SYSTOLIC BLOOD PRESSURE - MUSE: NORMAL MMHG
T AXIS - MUSE: 35 DEGREES
TROPONIN T SERPL HS-MCNC: <6 NG/L
VENTRICULAR RATE- MUSE: 94 BPM
WBC # BLD AUTO: 5.1 10E3/UL (ref 4–11)

## 2024-10-31 PROCEDURE — 85004 AUTOMATED DIFF WBC COUNT: CPT | Performed by: EMERGENCY MEDICINE

## 2024-10-31 PROCEDURE — 36415 COLL VENOUS BLD VENIPUNCTURE: CPT | Performed by: EMERGENCY MEDICINE

## 2024-10-31 PROCEDURE — 85379 FIBRIN DEGRADATION QUANT: CPT | Performed by: EMERGENCY MEDICINE

## 2024-10-31 PROCEDURE — 84484 ASSAY OF TROPONIN QUANT: CPT | Performed by: EMERGENCY MEDICINE

## 2024-10-31 PROCEDURE — 99284 EMERGENCY DEPT VISIT MOD MDM: CPT

## 2024-10-31 PROCEDURE — 93005 ELECTROCARDIOGRAM TRACING: CPT

## 2024-10-31 PROCEDURE — 80048 BASIC METABOLIC PNL TOTAL CA: CPT | Performed by: EMERGENCY MEDICINE

## 2024-10-31 RX ORDER — HYDROMORPHONE HYDROCHLORIDE 1 MG/ML
0.5 INJECTION, SOLUTION INTRAMUSCULAR; INTRAVENOUS; SUBCUTANEOUS
Status: DISCONTINUED | OUTPATIENT
Start: 2024-10-31 | End: 2024-10-31

## 2024-10-31 ASSESSMENT — ACTIVITIES OF DAILY LIVING (ADL): ADLS_ACUITY_SCORE: 0

## 2024-10-31 ASSESSMENT — COLUMBIA-SUICIDE SEVERITY RATING SCALE - C-SSRS
6. HAVE YOU EVER DONE ANYTHING, STARTED TO DO ANYTHING, OR PREPARED TO DO ANYTHING TO END YOUR LIFE?: NO
1. IN THE PAST MONTH, HAVE YOU WISHED YOU WERE DEAD OR WISHED YOU COULD GO TO SLEEP AND NOT WAKE UP?: NO
2. HAVE YOU ACTUALLY HAD ANY THOUGHTS OF KILLING YOURSELF IN THE PAST MONTH?: NO

## 2024-10-31 NOTE — ED TRIAGE NOTES
Here for concern of mid to right sided chest pain started about 2.5 weeks when patient woke in the morning. Associated symptoms of sob, intermittent palpitations, and tightness. Feels like getting stab in the chest. ABCs intact.      Triage Assessment (Adult)       Row Name 10/31/24 1011          Triage Assessment    Airway WDL WDL        Respiratory WDL    Respiratory WDL WDL        Cardiac WDL    Cardiac WDL chest pain

## 2024-10-31 NOTE — ED PROVIDER NOTES
"  Emergency Department Note      History of Present Illness     Chief Complaint   Chest Pain      HPI   Brady Sullivan is a 29 year old male with history of cardiac arrest due to electrocution who presents to the ED with a family member for evaluation of chest pain. Patient presents with 2.5 weeks of mid sternal to right sided chest pain. Brady describes this as a \"pressure\" and sharp, \"stabbing\" pain with occasional associated shortness of breath and tightness. He noticed the chest pain was worse when tying his shoe and when he had to  an object from the ground. He has not been evaluated since the chest pain onset. Notable history of cardiac arrest due to electrocution 8/6/24 which he recovered from. Endorses intermittent dyspnea on exertion. No fever or history of heart/lung problems.                Independent Historian   None    Review of External Notes       Past Medical History     Medical History and Problem List   Alcohol abuse  Major depressive disorder, recurrent   Chewing tobacco nicotine dependence with withdrawal  External hemorrhoids  Cardiac arrest due to electrocution     Medications   The patient is not currently taking any prescribed medications.     Physical Exam     Patient Vitals for the past 24 hrs:   BP Temp Temp src Pulse Resp SpO2 Height Weight   10/31/24 1013 (!) 150/99 97.5  F (36.4  C) Temporal 100 18 100 % 1.88 m (6' 2\") 113.4 kg (250 lb)     Physical Exam  Gen: well appearing, in no acute distress  Oral : Mucous membranes moist,   Nose: No rhinorhea  Ears: External near normal, without drainage  Eyes: periorbital tissues and sclera normal   Neck: supple, no abnormal swelling  Lungs: Clear bilaterally, no tachypnea or distress, speaks full sentences  CV: Regular rate, regular rhythm  Abd: soft, nontender, nondistended, no rebound/guarding  Ext: no lower extremity edema  Msk: Mild right sided rib and pectoralis tenderness.   Skin: warm, dry, well perfused, no " rashes/bruising/lesions on exposed skin  Neuro: alert, no gross motor or sensory deficits,   Psych: pleasant mood, normal affect     Diagnostics     Lab Results   Labs Ordered and Resulted from Time of ED Arrival to Time of ED Departure   BASIC METABOLIC PANEL - Normal       Result Value    Sodium 137      Potassium 4.0      Chloride 99      Carbon Dioxide (CO2) 23      Anion Gap 15      Urea Nitrogen 15.3      Creatinine 1.15      GFR Estimate 88      Calcium 9.4      Glucose 97     TROPONIN T, HIGH SENSITIVITY - Normal    Troponin T, High Sensitivity <6     D DIMER QUANTITATIVE - Normal    D-Dimer Quantitative <0.27     CBC WITH PLATELETS AND DIFFERENTIAL    WBC Count 5.1      RBC Count 5.58      Hemoglobin 17.1      Hematocrit 48.2      MCV 86      MCH 30.6      MCHC 35.5      RDW 11.5      Platelet Count 202      % Neutrophils 50      % Lymphocytes 34      % Monocytes 11      % Eosinophils 4      % Basophils 1      % Immature Granulocytes 1      NRBCs per 100 WBC 0      Absolute Neutrophils 2.5      Absolute Lymphocytes 1.7      Absolute Monocytes 0.6      Absolute Eosinophils 0.2      Absolute Basophils 0.0      Absolute Immature Granulocytes 0.0      Absolute NRBCs 0.0         Imaging   No orders to display       EKG   ECG taken at 1000, ECG read at 1000  Normal sinus rhythm  Normal ECG    Rate 94 bpm. MS interval 196 ms. QRS duration 90 ms. QT/QTc 332/415 ms. P-R-T axes 59 13 35.    Independent Interpretation   None    ED Course      Medications Administered   Medications - No data to display    Procedures   Procedures     Discussion of Management   None    ED Course   ED Course as of 10/31/24 1128   Thu Oct 31, 2024   1045 I obtained history and examined the patient as noted above.        Additional Documentation  None    Medical Decision Making / Diagnosis     CMS Diagnoses: None    MIPS       None    [facility]   Brady Sullivan is a 29 year old male with sharp pain typically in the mid to right side of his chest  near his pectoralis muscle.  Been on and off for couple weeks.  Patient is concerned because he was electrocuted at work several months ago requiring CPR and hospitalization.  Here today I see no evidence of ischemic heart disease, his D-dimer is negative his exam does suggest some component of musculoskeletal chest pain on that side.  He is not hypoxic, pulmonary exam is within normal limits I do not have strong suspicion for pneumothorax.  Given the benign reassuring workup will encourage patient to establish with PCP for annual physicals.  His blood pressure slightly elevated today but I see no evidence of hypertensive emergency.    Disposition   The patient was discharged.     Diagnosis     ICD-10-CM    1. Right-sided chest wall pain  R07.89            Discharge Medications   New Prescriptions    No medications on file         Scribe Disclosure:  I, Kamala Morillo, am serving as a scribe at 10:52 AM on 10/31/2024 to document services personally performed by Rodolfo Oniell MD based on my observations and the provider's statements to me.        Rodolfo Oneill MD  10/31/24 1128